# Patient Record
Sex: MALE | Race: AMERICAN INDIAN OR ALASKA NATIVE
[De-identification: names, ages, dates, MRNs, and addresses within clinical notes are randomized per-mention and may not be internally consistent; named-entity substitution may affect disease eponyms.]

---

## 2017-03-27 ENCOUNTER — HOSPITAL ENCOUNTER (EMERGENCY)
Dept: HOSPITAL 5 - ED | Age: 52
Discharge: LEFT BEFORE DISCHARGE | End: 2017-03-27
Payer: MEDICAID

## 2017-03-27 VITALS — DIASTOLIC BLOOD PRESSURE: 123 MMHG | SYSTOLIC BLOOD PRESSURE: 159 MMHG

## 2017-03-27 DIAGNOSIS — Z53.21: ICD-10-CM

## 2017-03-27 DIAGNOSIS — R51: ICD-10-CM

## 2017-03-27 DIAGNOSIS — R50.9: ICD-10-CM

## 2017-03-27 DIAGNOSIS — R05: Primary | ICD-10-CM

## 2017-03-27 LAB
ANION GAP SERPL CALC-SCNC: 16 MMOL/L
BASOPHILS NFR BLD AUTO: 1.2 % (ref 0–1.8)
BUN SERPL-MCNC: 9 MG/DL (ref 9–20)
BUN/CREAT SERPL: 9 %
CALCIUM SERPL-MCNC: 8.8 MG/DL (ref 8.4–10.2)
CHLORIDE SERPL-SCNC: 104.6 MMOL/L (ref 98–107)
CO2 SERPL-SCNC: 22 MMOL/L (ref 22–30)
EOSINOPHIL NFR BLD AUTO: 2.8 % (ref 0–4.3)
GLUCOSE SERPL-MCNC: 102 MG/DL (ref 75–100)
HCT VFR BLD CALC: 40.1 % (ref 35.5–45.6)
HGB BLD-MCNC: 13.2 GM/DL (ref 11.8–15.2)
MCH RBC QN AUTO: 29 PG (ref 28–32)
MCHC RBC AUTO-ENTMCNC: 33 % (ref 32–34)
MCV RBC AUTO: 89 FL (ref 84–94)
PLATELET # BLD: 191 K/MM3 (ref 140–440)
POTASSIUM SERPL-SCNC: 3.7 MMOL/L (ref 3.6–5)
RBC # BLD AUTO: 4.52 M/MM3 (ref 3.65–5.03)
SODIUM SERPL-SCNC: 139 MMOL/L (ref 137–145)
WBC # BLD AUTO: 9.2 K/MM3 (ref 4.5–11)

## 2017-03-27 PROCEDURE — 93005 ELECTROCARDIOGRAM TRACING: CPT

## 2017-03-27 PROCEDURE — 80048 BASIC METABOLIC PNL TOTAL CA: CPT

## 2017-03-27 PROCEDURE — 93010 ELECTROCARDIOGRAM REPORT: CPT

## 2017-03-27 PROCEDURE — 85025 COMPLETE CBC W/AUTO DIFF WBC: CPT

## 2017-03-27 PROCEDURE — 36415 COLL VENOUS BLD VENIPUNCTURE: CPT

## 2017-03-28 NOTE — ED ELOPEMENT REVIEW
ED Pt Elopement review





- Results review


Lab results: 





 Laboratory Tests











  03/27/17 03/27/17





  09:34 09:34


 


WBC  9.2 


 


RBC  4.52 


 


Hgb  13.2 


 


Hct  40.1 


 


MCV  89 


 


MCH  29 


 


MCHC  33 


 


RDW  14.8 


 


Plt Count  191 


 


Lymph % (Auto)  15.8 


 


Mono % (Auto)  7.1 


 


Eos % (Auto)  2.8 


 


Baso % (Auto)  1.2 


 


Lymph #  1.5 


 


Mono #  0.7 


 


Eos #  0.3 


 


Baso #  0.1 


 


Seg Neutrophils %  73.1 H 


 


Seg Neutrophils #  6.7 


 


Sodium   139


 


Potassium   3.7


 


Chloride   104.6


 


Carbon Dioxide   22


 


Anion Gap   16


 


BUN   9


 


Creatinine   1.0


 


Estimated GFR   > 60


 


BUN/Creatinine Ratio   9.00


 


Glucose   102 H


 


Calcium   8.8














- Call Back decision


Pt Call Back Decision: Pt to F/U with PMD (elevated bp, needs treatment and 

reevaluation)

## 2017-12-27 ENCOUNTER — HOSPITAL ENCOUNTER (EMERGENCY)
Dept: HOSPITAL 5 - ED | Age: 52
Discharge: HOME | End: 2017-12-27
Payer: MEDICAID

## 2017-12-27 VITALS — DIASTOLIC BLOOD PRESSURE: 97 MMHG | SYSTOLIC BLOOD PRESSURE: 124 MMHG

## 2017-12-27 DIAGNOSIS — R06.02: ICD-10-CM

## 2017-12-27 DIAGNOSIS — I50.9: Primary | ICD-10-CM

## 2017-12-27 DIAGNOSIS — F17.200: ICD-10-CM

## 2017-12-27 DIAGNOSIS — I25.2: ICD-10-CM

## 2017-12-27 DIAGNOSIS — I10: ICD-10-CM

## 2017-12-27 DIAGNOSIS — K21.9: ICD-10-CM

## 2017-12-27 LAB
ANION GAP SERPL CALC-SCNC: 16 MMOL/L
BASOPHILS NFR BLD AUTO: 0.4 % (ref 0–1.8)
BUN SERPL-MCNC: 16 MG/DL (ref 9–20)
BUN/CREAT SERPL: 15 %
CALCIUM SERPL-MCNC: 8.7 MG/DL (ref 8.4–10.2)
CHLORIDE SERPL-SCNC: 103.4 MMOL/L (ref 98–107)
CO2 SERPL-SCNC: 30 MMOL/L (ref 22–30)
EOSINOPHIL NFR BLD AUTO: 2.4 % (ref 0–4.3)
GLUCOSE SERPL-MCNC: 98 MG/DL (ref 75–100)
HCT VFR BLD CALC: 41.5 % (ref 35.5–45.6)
HGB BLD-MCNC: 13.9 GM/DL (ref 11.8–15.2)
MCH RBC QN AUTO: 30 PG (ref 28–32)
MCHC RBC AUTO-ENTMCNC: 34 % (ref 32–34)
MCV RBC AUTO: 91 FL (ref 84–94)
PLATELET # BLD: 180 K/MM3 (ref 140–440)
POTASSIUM SERPL-SCNC: 4.2 MMOL/L (ref 3.6–5)
RBC # BLD AUTO: 4.59 M/MM3 (ref 3.65–5.03)
SODIUM SERPL-SCNC: 145 MMOL/L (ref 137–145)
URINE DRUGS OF ABUSE NOTE: (no result)
WBC # BLD AUTO: 6.6 K/MM3 (ref 4.5–11)

## 2017-12-27 PROCEDURE — 36415 COLL VENOUS BLD VENIPUNCTURE: CPT

## 2017-12-27 PROCEDURE — 85025 COMPLETE CBC W/AUTO DIFF WBC: CPT

## 2017-12-27 PROCEDURE — 93010 ELECTROCARDIOGRAM REPORT: CPT

## 2017-12-27 PROCEDURE — 80048 BASIC METABOLIC PNL TOTAL CA: CPT

## 2017-12-27 PROCEDURE — 99285 EMERGENCY DEPT VISIT HI MDM: CPT

## 2017-12-27 PROCEDURE — 93005 ELECTROCARDIOGRAM TRACING: CPT

## 2017-12-27 PROCEDURE — 83880 ASSAY OF NATRIURETIC PEPTIDE: CPT

## 2017-12-27 PROCEDURE — 80307 DRUG TEST PRSMV CHEM ANLYZR: CPT

## 2017-12-27 PROCEDURE — 96374 THER/PROPH/DIAG INJ IV PUSH: CPT

## 2017-12-27 PROCEDURE — 71010: CPT

## 2017-12-27 PROCEDURE — 84484 ASSAY OF TROPONIN QUANT: CPT
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
## 2017-12-27 NOTE — XRAY REPORT
FINAL REPORT



PROCEDURE:  XR CHEST 1V AP



TECHNIQUE:  Chest radiograph anteroposterior view. CPT 83254







HISTORY:  Shortness of breath 



COMPARISON:  No prior studies are available for comparison.



FINDINGS:  

Heart: Magnified due to projection although appears to be mildly

enlarged..



Mediastinum/Vessels: Normal.



Lungs/Pleural space: Normal.



Bony thorax: No acute osseous abnormality.



Life support devices: None.



IMPRESSION:  

Cardiomegaly. No acute abnormalities are identified..

## 2018-01-04 ENCOUNTER — HOSPITAL ENCOUNTER (EMERGENCY)
Dept: HOSPITAL 5 - ED | Age: 53
Discharge: LEFT BEFORE DISCHARGE | End: 2018-01-04
Payer: MEDICAID

## 2018-01-04 VITALS — DIASTOLIC BLOOD PRESSURE: 117 MMHG | SYSTOLIC BLOOD PRESSURE: 148 MMHG

## 2018-01-04 DIAGNOSIS — R07.9: Primary | ICD-10-CM

## 2018-01-04 DIAGNOSIS — Z53.21: ICD-10-CM

## 2018-01-04 LAB
BASOPHILS # (AUTO): 0 K/MM3 (ref 0–0.1)
BASOPHILS NFR BLD AUTO: 0.4 % (ref 0–1.8)
BUN SERPL-MCNC: 15 MG/DL (ref 9–20)
BUN/CREAT SERPL: 14 %
CALCIUM SERPL-MCNC: 8.7 MG/DL (ref 8.4–10.2)
EOSINOPHIL # BLD AUTO: 0.1 K/MM3 (ref 0–0.4)
EOSINOPHIL NFR BLD AUTO: 1.8 % (ref 0–4.3)
HCT VFR BLD CALC: 41.7 % (ref 35.5–45.6)
HEMOLYSIS INDEX: 17
HGB BLD-MCNC: 13.5 GM/DL (ref 11.8–15.2)
INR PPP: 0.87 (ref 0.87–1.13)
LYMPHOCYTES # BLD AUTO: 1.8 K/MM3 (ref 1.2–5.4)
LYMPHOCYTES NFR BLD AUTO: 32.6 % (ref 13.4–35)
MCH RBC QN AUTO: 29 PG (ref 28–32)
MCHC RBC AUTO-ENTMCNC: 32 % (ref 32–34)
MCV RBC AUTO: 90 FL (ref 84–94)
MONOCYTES # (AUTO): 0.7 K/MM3 (ref 0–0.8)
MONOCYTES % (AUTO): 13.1 % (ref 0–7.3)
PLATELET # BLD: 181 K/MM3 (ref 140–440)
RBC # BLD AUTO: 4.65 M/MM3 (ref 3.65–5.03)

## 2018-01-04 PROCEDURE — 82553 CREATINE MB FRACTION: CPT

## 2018-01-04 PROCEDURE — 85610 PROTHROMBIN TIME: CPT

## 2018-01-04 PROCEDURE — 82550 ASSAY OF CK (CPK): CPT

## 2018-01-04 PROCEDURE — 93005 ELECTROCARDIOGRAM TRACING: CPT

## 2018-01-04 PROCEDURE — 71046 X-RAY EXAM CHEST 2 VIEWS: CPT

## 2018-01-04 PROCEDURE — 83880 ASSAY OF NATRIURETIC PEPTIDE: CPT

## 2018-01-04 PROCEDURE — 84484 ASSAY OF TROPONIN QUANT: CPT

## 2018-01-04 PROCEDURE — 85025 COMPLETE CBC W/AUTO DIFF WBC: CPT

## 2018-01-04 PROCEDURE — 93010 ELECTROCARDIOGRAM REPORT: CPT

## 2018-01-04 PROCEDURE — 80048 BASIC METABOLIC PNL TOTAL CA: CPT

## 2018-01-04 PROCEDURE — 36415 COLL VENOUS BLD VENIPUNCTURE: CPT

## 2018-01-04 NOTE — XRAY REPORT
PA and lateral chest:



SOB.



The heart is big. There is mild vascular congestion. No effusion and no 

infiltrate. The vascular congestion may be slightly worsened seen on 

prior exam of December 27, 2017.



Impression:



Probable mild CHF pattern.

## 2018-07-19 ENCOUNTER — HOSPITAL ENCOUNTER (INPATIENT)
Dept: HOSPITAL 5 - ED | Age: 53
LOS: 2 days | Discharge: HOME | DRG: 682 | End: 2018-07-21
Attending: INTERNAL MEDICINE | Admitting: INTERNAL MEDICINE
Payer: MEDICAID

## 2018-07-19 DIAGNOSIS — I13.0: ICD-10-CM

## 2018-07-19 DIAGNOSIS — E86.0: ICD-10-CM

## 2018-07-19 DIAGNOSIS — Z82.49: ICD-10-CM

## 2018-07-19 DIAGNOSIS — K85.90: ICD-10-CM

## 2018-07-19 DIAGNOSIS — Z79.82: ICD-10-CM

## 2018-07-19 DIAGNOSIS — J44.9: ICD-10-CM

## 2018-07-19 DIAGNOSIS — K52.9: ICD-10-CM

## 2018-07-19 DIAGNOSIS — I50.22: ICD-10-CM

## 2018-07-19 DIAGNOSIS — I42.0: ICD-10-CM

## 2018-07-19 DIAGNOSIS — K21.9: ICD-10-CM

## 2018-07-19 DIAGNOSIS — K56.7: ICD-10-CM

## 2018-07-19 DIAGNOSIS — F14.10: ICD-10-CM

## 2018-07-19 DIAGNOSIS — N17.9: Primary | ICD-10-CM

## 2018-07-19 DIAGNOSIS — I25.2: ICD-10-CM

## 2018-07-19 DIAGNOSIS — N18.9: ICD-10-CM

## 2018-07-19 DIAGNOSIS — I47.2: ICD-10-CM

## 2018-07-19 LAB
ALBUMIN SERPL-MCNC: 4.2 G/DL (ref 3.9–5)
ALT SERPL-CCNC: 17 UNITS/L (ref 7–56)
BASOPHILS # (AUTO): 0.1 K/MM3 (ref 0–0.1)
BASOPHILS NFR BLD AUTO: 0.8 % (ref 0–1.8)
BILIRUB UR QL STRIP: (no result)
BLOOD UR QL VISUAL: (no result)
BUN SERPL-MCNC: 29 MG/DL (ref 9–20)
BUN/CREAT SERPL: 7 %
CALCIUM SERPL-MCNC: 9.2 MG/DL (ref 8.4–10.2)
EOSINOPHIL # BLD AUTO: 0 K/MM3 (ref 0–0.4)
EOSINOPHIL NFR BLD AUTO: 0.2 % (ref 0–4.3)
HCT VFR BLD CALC: 47.8 % (ref 35.5–45.6)
HEMOLYSIS INDEX: 8
HGB BLD-MCNC: 16.4 GM/DL (ref 11.8–15.2)
LIPASE SERPL-CCNC: 94 UNITS/L (ref 13–60)
LIPASE SERPL-CCNC: 94 UNITS/L (ref 13–60)
LYMPHOCYTES # BLD AUTO: 1.9 K/MM3 (ref 1.2–5.4)
LYMPHOCYTES NFR BLD AUTO: 17.9 % (ref 13.4–35)
MCH RBC QN AUTO: 31 PG (ref 28–32)
MCHC RBC AUTO-ENTMCNC: 34 % (ref 32–34)
MCV RBC AUTO: 91 FL (ref 84–94)
MONOCYTES # (AUTO): 0.7 K/MM3 (ref 0–0.8)
MONOCYTES % (AUTO): 6.1 % (ref 0–7.3)
MUCOUS THREADS #/AREA URNS HPF: (no result) /HPF
PH UR STRIP: 5 [PH] (ref 5–7)
PLATELET # BLD: 260 K/MM3 (ref 140–440)
RBC # BLD AUTO: 5.25 M/MM3 (ref 3.65–5.03)
RBC #/AREA URNS HPF: 6 /HPF (ref 0–6)
UROBILINOGEN UR-MCNC: 2 MG/DL (ref ?–2)
WBC #/AREA URNS HPF: 3 /HPF (ref 0–6)

## 2018-07-19 PROCEDURE — C9113 INJ PANTOPRAZOLE SODIUM, VIA: HCPCS

## 2018-07-19 PROCEDURE — 80053 COMPREHEN METABOLIC PANEL: CPT

## 2018-07-19 PROCEDURE — 93005 ELECTROCARDIOGRAM TRACING: CPT

## 2018-07-19 PROCEDURE — 83036 HEMOGLOBIN GLYCOSYLATED A1C: CPT

## 2018-07-19 PROCEDURE — 36415 COLL VENOUS BLD VENIPUNCTURE: CPT

## 2018-07-19 PROCEDURE — 83735 ASSAY OF MAGNESIUM: CPT

## 2018-07-19 PROCEDURE — 84484 ASSAY OF TROPONIN QUANT: CPT

## 2018-07-19 PROCEDURE — 74176 CT ABD & PELVIS W/O CONTRAST: CPT

## 2018-07-19 PROCEDURE — 85025 COMPLETE CBC W/AUTO DIFF WBC: CPT

## 2018-07-19 PROCEDURE — 82150 ASSAY OF AMYLASE: CPT

## 2018-07-19 PROCEDURE — 81001 URINALYSIS AUTO W/SCOPE: CPT

## 2018-07-19 PROCEDURE — 99406 BEHAV CHNG SMOKING 3-10 MIN: CPT

## 2018-07-19 PROCEDURE — 83690 ASSAY OF LIPASE: CPT

## 2018-07-19 PROCEDURE — 93306 TTE W/DOPPLER COMPLETE: CPT

## 2018-07-19 PROCEDURE — 80048 BASIC METABOLIC PNL TOTAL CA: CPT

## 2018-07-19 PROCEDURE — 93010 ELECTROCARDIOGRAM REPORT: CPT

## 2018-07-19 PROCEDURE — 74018 RADEX ABDOMEN 1 VIEW: CPT

## 2018-07-19 RX ADMIN — Medication SCH ML: at 23:11

## 2018-07-19 RX ADMIN — DEXTROSE AND SODIUM CHLORIDE SCH MLS/HR: 5; .9 INJECTION, SOLUTION INTRAVENOUS at 23:05

## 2018-07-19 RX ADMIN — PANTOPRAZOLE SODIUM SCH MG: 40 INJECTION, POWDER, FOR SOLUTION INTRAVENOUS at 23:11

## 2018-07-19 RX ADMIN — MORPHINE SULFATE PRN MG: 2 INJECTION, SOLUTION INTRAMUSCULAR; INTRAVENOUS at 23:12
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
## 2018-07-19 NOTE — CAT SCAN REPORT
FINAL REPORT



PROCEDURE:  CT ABDOMEN PELVIS WO CON



TECHNIQUE:  Computerized axial tomography of the abdomen and

pelvis was performed without intravenous contrast. This study is

performed without intravascular contrast material and its

sensitivity for abdominal and pelvic pathology, including

neoplasms, inflammation, abscess, free fluid, thrombosis,

arterial dissection and infarction, is reduced compared with a

contrast enhanced study. DLP 1783.26 mGy-cm.



HISTORY:  Abdominal pain. Nausea/vomiting. 



COMPARISON:  No prior studies are available for comparison.



FINDINGS:  

Visualized lower thorax: Mild cardiomegaly. 



Liver: Normal size and attenuation.



Spleen: Normal size and attenuation.



Gallbladder and biliary system: Normal.



Pancreas: Normal.



Adrenals: Bilateral thickened somewhat nodular appearing adrenal

glands. Adreniform shape relatively maintained..



Kidneys: Normal.



GI tract: Moderately distended duodenum and jejunum, with gradual

tapering to more normal caliber ileum. Normal caliber air-filled

appendix. Thick-walled short segment distal transverse colon wall

thickening measuring 11 mm (image 69 series 2, image 41 series

602). Descending and more distal colon decompressed. Scattered

colonic diverticula. 



Lymph nodes and mesentery: Normal.



Vasculature: Normal.



Bladder: Normal.



Reproductive organs: Small prostatic calcifications.



Peritoneum: No free fluid.



Musculoskeletal structures: Slight L4-5 and mild L5-S1 disc

bulges. L4-5 ligamentum flavum thickening. Tiny multilevel

osteophytes. L5-S1 disc space narrowing and vacuum disc change.

Probably benign cystic and/or fibrous lesion in the left iliac

wing measuring 2.8 x 1.6 cm. Mild narrowing, osteophytes, and

small subchondral cysts about the right hip joint. 



Other: Small fat filled umbilical hernia. Right greater than left

fat filled inguinal hernias. 



IMPRESSION:  

Mild cardiomegaly.



No CT evidence of renal/ureteral stone or obstruction. 



Bilateral thickened somewhat nodular appearing adrenal glands

with relatively maintained adreniform shape. Consider adrenal

hyperplasia. 



Distended duodenum and jejunum, with gradual tapering. Findings

more likely represent an ileus or enteritis rather than

obstruction, consider attention on followup radiographs if there

is continued clinical concern. 



Short segment of distal transverse colonic wall thickening and

narrowing, likely peristalsis but consider further evaluation

including colonoscopy if there is concern for subtle annular

colonic lesion. 



Diverticulosis. 



Small fat filled umbilical and right greater than left inguinal

hernias. 



Other incidental findings as above.

## 2018-07-20 LAB
ALBUMIN SERPL-MCNC: 3.7 G/DL (ref 3.9–5)
ALT SERPL-CCNC: 17 UNITS/L (ref 7–56)
BASOPHILS # (AUTO): 0 K/MM3 (ref 0–0.1)
BASOPHILS NFR BLD AUTO: 0.3 % (ref 0–1.8)
BUN SERPL-MCNC: 30 MG/DL (ref 9–20)
BUN/CREAT SERPL: 12 %
CALCIUM SERPL-MCNC: 8.4 MG/DL (ref 8.4–10.2)
EOSINOPHIL # BLD AUTO: 0 K/MM3 (ref 0–0.4)
EOSINOPHIL NFR BLD AUTO: 0.3 % (ref 0–4.3)
HCT VFR BLD CALC: 43.6 % (ref 35.5–45.6)
HEMOLYSIS INDEX: 2
HGB BLD-MCNC: 14.5 GM/DL (ref 11.8–15.2)
LYMPHOCYTES # BLD AUTO: 1.5 K/MM3 (ref 1.2–5.4)
LYMPHOCYTES NFR BLD AUTO: 18.4 % (ref 13.4–35)
MCH RBC QN AUTO: 31 PG (ref 28–32)
MCHC RBC AUTO-ENTMCNC: 33 % (ref 32–34)
MCV RBC AUTO: 92 FL (ref 84–94)
MONOCYTES # (AUTO): 0.8 K/MM3 (ref 0–0.8)
MONOCYTES % (AUTO): 9.1 % (ref 0–7.3)
PLATELET # BLD: 205 K/MM3 (ref 140–440)
RBC # BLD AUTO: 4.75 M/MM3 (ref 3.65–5.03)

## 2018-07-20 RX ADMIN — CARVEDILOL SCH MG: 6.25 TABLET, FILM COATED ORAL at 16:00

## 2018-07-20 RX ADMIN — HEPARIN SODIUM SCH UNIT: 5000 INJECTION, SOLUTION INTRAVENOUS; SUBCUTANEOUS at 22:41

## 2018-07-20 RX ADMIN — MORPHINE SULFATE PRN MG: 2 INJECTION, SOLUTION INTRAMUSCULAR; INTRAVENOUS at 06:25

## 2018-07-20 RX ADMIN — Medication SCH ML: at 09:25

## 2018-07-20 RX ADMIN — DEXTROSE AND SODIUM CHLORIDE SCH MLS/HR: 5; .9 INJECTION, SOLUTION INTRAVENOUS at 22:47

## 2018-07-20 RX ADMIN — DEXTROSE AND SODIUM CHLORIDE SCH MLS/HR: 5; .9 INJECTION, SOLUTION INTRAVENOUS at 06:25

## 2018-07-20 RX ADMIN — CARVEDILOL SCH MG: 6.25 TABLET, FILM COATED ORAL at 22:38

## 2018-07-20 RX ADMIN — Medication SCH ML: at 22:30

## 2018-07-20 RX ADMIN — PANTOPRAZOLE SODIUM SCH MG: 40 INJECTION, POWDER, FOR SOLUTION INTRAVENOUS at 22:39

## 2018-07-20 RX ADMIN — PANTOPRAZOLE SODIUM SCH MG: 40 INJECTION, POWDER, FOR SOLUTION INTRAVENOUS at 15:00

## 2018-07-20 NOTE — CONSULTATION
History of Present Illness





- Reason for Consult


Consult date: 07/20/18


acute renal failure





- History of Present Illness





Pleasant middle aged  male with history pertinent for HTN, CHF, 

CAD s/p PCI, and HLD, who presented to the hospital secondary to symptoms of 

reocurent nasuea, vomiting and abdominal discomfort compounded by decreased PO 

intake. These symptoms had occured for the past 5 days before patient came to 

the ER for further evaluation. His father had recently passed away. In this 

setting of decreased PO intake his serum creatinine level was elevated at 3.7 

from a baseline of 1.2-1.4. His serum creatinine has already shown improvement 

down to 2.5 after fluid hydration. Nephrology was consulted for acute renal 

injury. He denies nay symptoms of pain with urination, hematuria or decreased 

urination. He states that also this week he had been working outside one day 

prior to arrival to the ER. During the time that he was working he managed to 

only drink ~16 floz of gatorade. He apparently was working outside in the heat 

for ~6 hrs. He states the he was never aware of an elevated creatinine in the 

past and that he has never been evaluated by nephrology. He is also being 

managed cardiology along with his PCP as an outpatient. Today he states that he 

is feeling better and he has been started on a clear liquid diet upon the time 

of examination. 





Past History


Past Medical History: CAD, hypertension, hyperlipidemia


Past Surgical History: Other (eye surgery )


Social history: no significant social history


Family history: hypertension





Medications and Allergies


 Allergies











Allergy/AdvReac Type Severity Reaction Status Date / Time


 


No Known Allergies Allergy   Verified 12/27/17 16:12











 Home Medications











 Medication  Instructions  Recorded  Confirmed  Last Taken  Type


 


ALBUTEROL Inhaler [ProAir HFA 1 - 2 puff IH Q4-6H PRN #1 inha 10/06/17 05/30/18 

Unknown Rx





Inhaler]     


 


Lisinopril [Zestril TAB] 20 mg PO QDAY #30 tablet 10/06/17 05/30/18 05/28/18 Rx


 


Carvedilol [Coreg] 12.5 mg PO BID 05/30/18 05/30/18 05/28/18 History


 


Pantoprazole [Protonix TAB] 40 mg PO QDAY 05/30/18 05/30/18 05/28/18 History


 


Potassium Chloride [K-Dur] 10 meq PO QDAY 05/30/18 05/30/18 05/28/18 History


 


Aspirin [Aspirin TAB] 325 mg PO QDAY #30 tablet 05/31/18  Unknown Rx


 


Spironolactone [Aldactone] 25 mg PO QDAY #30 tablet 05/31/18  Unknown Rx


 


Torsemide [Demadex] 20 mg PO DAILY #30 tablet 05/31/18  Unknown Rx











Active Meds: 


Active Medications





Acetaminophen (Tylenol)  650 mg PO Q4H PRN


   PRN Reason: Pain MILD(1-3)/Fever >100.5/HA


Carvedilol (Coreg)  6.25 mg PO BID [facility]


Dextrose/Sodium Chloride (D5ns)  1,000 mls @ 100 mls/hr IV AS DIRECT [facility]


   Last Admin: 07/20/18 06:25 Dose:  100 mls/hr


Metoclopramide HCl (Reglan)  5 mg IV Q6H PRN


   PRN Reason: Nausea And Vomiting


Morphine Sulfate (Morphine)  2 mg IV Q4H PRN


   PRN Reason: Pain, Moderate (4-6)


   Last Admin: 07/20/18 06:25 Dose:  2 mg


Ondansetron HCl (Zofran)  4 mg IV Q8H PRN


   PRN Reason: Nausea And Vomiting


Pantoprazole Sodium (Protonix)  40 mg IV BID [facility]


   Stop: 07/20/18 23:59


   Last Admin: 07/19/18 23:11 Dose:  40 mg


Pantoprazole Sodium (Protonix)  40 mg PO BID [facility]


Promethazine HCl (Phenergan)  25 mg NE Q6H PRN


   PRN Reason: N/V IF NPO AND NO IV ACCESS


Sodium Chloride (Sodium Chloride Flush Syringe 10 Ml)  10 ml IV BID [facility]


   Last Admin: 07/19/18 23:11 Dose:  10 ml


Sodium Chloride (Sodium Chloride Flush Syringe 10 Ml)  10 ml IV PRN PRN


   PRN Reason: LINE FLUSH











Review of Systems


All systems: negative


Constitutional: fatigue, weakness, poor appetite


Gastrointestinal: abdominal pain, nausea, vomiting, excessive gas, dyspepsia/

bloating, early satiety





Exam





- Vital Signs


Vital signs: 


 Vital Signs











Temp Pulse Resp BP Pulse Ox


 


 97.6 F   69   16   101/66   95 


 


 07/19/18 10:26  07/19/18 10:26  07/19/18 10:26  07/19/18 10:26  07/19/18 10:26














- General Appearance


General appearance: well-developed, well-nourished, appears stated age


EENT: PERRL, mucous membranes moist


Neck: Present: neck supple, trachea midline


Respiratory: Clear to Ascultation


Heart: regular, normal heart rate, S1S2, no murmurs


Gastrointestinal: Present: normal, normoactive bowel sounds


Integumentary: no rash, warm and dry


Neurologic: no focal deficit, no asterixis, alert and oriented x3


Musculoskeletal: Present: deferred


Psychiatric: mood/affect appropriate





Results





- Lab Results





 07/20/18 07:06





 07/20/18 07:06


 Most recent lab results











Calcium  8.4 mg/dL (8.4-10.2)   07/20/18  07:06    














Assessment and Plan





- Patient Problems


(1) Acute on chronic renal failure


Current Visit: Yes   Status: Acute   


Plan to address problem: 


His renal function is improving with fluid hydration. Encourage PO intake as 

tolerated.


Avoid nephrotoxins. Would continue to hold ACei and monitor blood pressure 

closely. Once he is tolerating PO intake, would favor to discontinue his IV 

fluids given his cardiac history and h/o CHF.


 








(2) Hypertension with renal disease


Current Visit: Yes   Status: Acute   


Plan to address problem: 


Patient restarted on coreg with ACEi being held. His current blood pressures 

are stable at present time. Will continue to monitor. 








(3) CHF (congestive heart failure)


Current Visit: No   Status: Chronic   


Qualifiers: 


   Qualified Code(s): I50.9 - Heart failure, unspecified   


Plan to address problem: 


Given his history of CHF, please avoid standing IVF once he is able to tolerate 

PO intake.


Patient should already be on a low sodium diet.  








(4) Acute pancreatitis


Current Visit: Yes   Status: Acute   


Qualifiers: 


   Acute pancreatitis complication: unspecified 


Plan to address problem: 


Patient is now on clear liquids and agree with advancing diet as tolerated. 

Would encourage more PO intake and avoid standing IVF given history of CHF.

## 2018-07-20 NOTE — PROGRESS NOTE
Assessment and Plan


Assessment and plan: 





53-year-old -American male with past medical history significant for 

cardiomyopathy with EF of 20%, cocaine abuse, hypertension presented to the 

emergency department complaining of recurrent nausea and vomiting.  At 

presentation patient was dehydrated and creatinine was 3.7.  CT abdomen and 

pelvis showed ileus, that resolved this morning.


Acute renal failure, likely due to dehydration


- Patient is on IV fluids


- Nephrology is following


- Creatinine is from 3.7-2.5


- We will monitor closely


Nausea and vomiting secondary to ileus


- Abdominal x-ray showed ileus resolved


- Nausea and vomiting subsided


- Patient stated advanced to a full cardiac diet


Cardiomyopathy ejection fraction of 20 percent


- Patient refused ICD previously


- Patient had nonsustained V. tach and cardiology evaluated him and recommended 

no further cardiac workup


- No shortness of breath, will stop the fluid if the patient has any signs of 

fluid overload


Hypertension


- Held ACEI because of acute renal failure and continue with Coreg


- BP is well-controlled


DVT prophylaxis


- Heparin


GI prophylaxis


- On Protonix


Disposition


- Possible discharge tomorrow if ARF is progressively improving.





History


Interval history: 





Patient was seen and evaluated this morning, nausea and vomiting subsided.  

Patient's complaining generalized weakness.  No chest pain.





Hospitalist Physical





- Physical exam


Narrative exam: 





 Not in cardiopulmonary distress. 


 The patient appeared well nourished and normally developed.


 Vital signs as documented.


 Head exam is unremarkable.


 No scleral icterus .


 Neck is without jugular venous distension, thyromegaly, or carotid bruits. 


 Lungs are clear to auscultation.


Cardiac exam reveals regular rate and  Rhythm. First and second heart sounds 

normal. No murmurs, rubs or gallops. 


Abdominal exam reveals normal bowel sounds, no masses, no organomegaly and no 

aortic enlargement. 


Extremities are nonedematous and both femoral and pedal pulses are normal.


CNS: Alert and oriented 3.  No focal weakness.





- Constitutional


Vitals: 


 











Temp Pulse Resp BP Pulse Ox


 


 99.5 F   76   20   130/99   99 


 


 07/20/18 13:11  07/20/18 13:11  07/20/18 13:11  07/20/18 13:11  07/20/18 13:11











General appearance: Present: no acute distress





Results





- Labs


CBC & Chem 7: 


 07/20/18 07:06





 07/20/18 07:06


Labs: 


 Laboratory Last Values











WBC  8.4 K/mm3 (4.5-11.0)   07/20/18  07:06    


 


RBC  4.75 M/mm3 (3.65-5.03)   07/20/18  07:06    


 


Hgb  14.5 gm/dl (11.8-15.2)   07/20/18  07:06    


 


Hct  43.6 % (35.5-45.6)   07/20/18  07:06    


 


MCV  92 fl (84-94)   07/20/18  07:06    


 


MCH  31 pg (28-32)   07/20/18  07:06    


 


MCHC  33 % (32-34)   07/20/18  07:06    


 


RDW  15.0 % (13.2-15.2)   07/20/18  07:06    


 


Plt Count  205 K/mm3 (140-440)   07/20/18  07:06    


 


Lymph % (Auto)  18.4 % (13.4-35.0)   07/20/18  07:06    


 


Mono % (Auto)  9.1 % (0.0-7.3)  H  07/20/18  07:06    


 


Eos % (Auto)  0.3 % (0.0-4.3)   07/20/18  07:06    


 


Baso % (Auto)  0.3 % (0.0-1.8)   07/20/18  07:06    


 


Lymph #  1.5 K/mm3 (1.2-5.4)   07/20/18  07:06    


 


Mono #  0.8 K/mm3 (0.0-0.8)   07/20/18  07:06    


 


Eos #  0.0 K/mm3 (0.0-0.4)   07/20/18  07:06    


 


Baso #  0.0 K/mm3 (0.0-0.1)   07/20/18  07:06    


 


Seg Neutrophils %  71.9 % (40.0-70.0)  H  07/20/18  07:06    


 


Seg Neutrophils #  6.0 K/mm3 (1.8-7.7)   07/20/18  07:06    


 


Sodium  142 mmol/L (137-145)   07/20/18  07:06    


 


Potassium  3.8 mmol/L (3.6-5.0)   07/20/18  07:06    


 


Chloride  101.9 mmol/L ()   07/20/18  07:06    


 


Carbon Dioxide  26 mmol/L (22-30)   07/20/18  07:06    


 


Anion Gap  18 mmol/L  07/20/18  07:06    


 


BUN  30 mg/dL (9-20)  H  07/20/18  07:06    


 


Creatinine  2.5 mg/dL (0.8-1.5)  H  07/20/18  07:06    


 


Estimated GFR  33 ml/min  07/20/18  07:06    


 


BUN/Creatinine Ratio  12 %  07/20/18  07:06    


 


Glucose  109 mg/dL ()  H  07/20/18  07:06    


 


Hemoglobin A1c  6.3 % (4-6)  H  07/19/18  21:24    


 


Calcium  8.4 mg/dL (8.4-10.2)   07/20/18  07:06    


 


Magnesium  2.00 mg/dL (1.7-2.3)   07/20/18  15:28    


 


Total Bilirubin  0.90 mg/dL (0.1-1.2)   07/20/18  07:06    


 


AST  21 units/L (5-40)   07/20/18  07:06    


 


ALT  17 units/L (7-56)   07/20/18  07:06    


 


Alkaline Phosphatase  65 units/L ()   07/20/18  07:06    


 


Troponin T  0.028 ng/mL (0.00-0.029)   07/19/18  15:31    


 


Total Protein  6.5 g/dL (6.3-8.2)   07/20/18  07:06    


 


Albumin  3.7 g/dL (3.9-5)  L  07/20/18  07:06    


 


Albumin/Globulin Ratio  1.3 %  07/20/18  07:06    


 


Amylase  252 units/L ()  H  07/20/18  07:06    


 


Lipase  94 units/L (13-60)  H  07/19/18  15:31    


 


Urine Color  Yellow  (Yellow)   07/19/18  14:35    


 


Urine Turbidity  Clear  (Clear)   07/19/18  14:35    


 


Urine pH  5.0  (5.0-7.0)   07/19/18  14:35    


 


Ur Specific Gravity  1.025  (1.003-1.030)   07/19/18  14:35    


 


Urine Protein  30 mg/dl mg/dL (Negative)   07/19/18  14:35    


 


Urine Glucose (UA)  50 mg/dL (Negative)   07/19/18  14:35    


 


Urine Ketones  Tr mg/dL (Negative)   07/19/18  14:35    


 


Urine Blood  Neg  (Negative)   07/19/18  14:35    


 


Urine Nitrite  Neg  (Negative)   07/19/18  14:35    


 


Urine Bilirubin  Neg  (Negative)   07/19/18  14:35    


 


Urine Urobilinogen  2.0 mg/dL (<2.0)   07/19/18  14:35    


 


Ur Leukocyte Esterase  Neg  (Negative)   07/19/18  14:35    


 


Urine WBC (Auto)  3.0 /HPF (0.0-6.0)   07/19/18  14:35    


 


Urine RBC (Auto)  6.0 /HPF (0.0-6.0)   07/19/18  14:35    


 


Urine Mucus  Few /HPF  07/19/18  14:35

## 2018-07-20 NOTE — CONSULTATION
History of Present Illness


Consult date: 07/20/18


Consult reason: arrhythmia


History of present illness: 





52-year-old man with a long history of dilated nonischemic cardiomyopathy. A 

cardiac catheterization several years ago at [facility] demonstrated 

the absence of coronary artery disease. His latest echocardiogram documents a 

severe left ventricular dysfunction, ejection fraction 15-20%. Stress thallium 

test showed no ischemia. He has been on medical therapy for systolic left 

ventricle dysfunction and undergoing ICD evaluation as an outpatient.





He presents to the hospital nausea, vomiting, abdominal pain, admitted with 

acute renal failure and dehydration. Creatinine of 3.9 on presentation. This 

morning, while on telemetry monitoring patient noted a short burst of non-

sustained ventricular tachycardia. Cardiac consultation was requested. Patient 

remained asymptomatic. There were no unusual shortness of breath, or chest 

pain. He denies palpitations. 12 lead ECG is a sinus rhythm, LVH with 

repolarization abnormalities. 





Medications and Allergies


 Allergies











Allergy/AdvReac Type Severity Reaction Status Date / Time


 


No Known Allergies Allergy   Verified 12/27/17 16:12











 Home Medications











 Medication  Instructions  Recorded  Confirmed  Last Taken  Type


 


ALBUTEROL Inhaler [ProAir HFA 1 - 2 puff IH Q4-6H PRN #1 inha 10/06/17 05/30/18 

Unknown Rx





Inhaler]     


 


Lisinopril [Zestril TAB] 20 mg PO QDAY #30 tablet 10/06/17 05/30/18 05/28/18 Rx


 


Carvedilol [Coreg] 12.5 mg PO BID 05/30/18 05/30/18 05/28/18 History


 


Pantoprazole [Protonix TAB] 40 mg PO QDAY 05/30/18 05/30/18 05/28/18 History


 


Potassium Chloride [K-Dur] 10 meq PO QDAY 05/30/18 05/30/18 05/28/18 History


 


Aspirin [Aspirin TAB] 325 mg PO QDAY #30 tablet 05/31/18  Unknown Rx


 


Spironolactone [Aldactone] 25 mg PO QDAY #30 tablet 05/31/18  Unknown Rx


 


Torsemide [Demadex] 20 mg PO DAILY #30 tablet 05/31/18  Unknown Rx











Active Meds: 


Active Medications





Acetaminophen (Tylenol)  650 mg PO Q4H PRN


   PRN Reason: Pain MILD(1-3)/Fever >100.5/HA


Dextrose/Sodium Chloride (D5ns)  1,000 mls @ 100 mls/hr IV AS DIRECT [facility]


   Last Admin: 07/20/18 06:25 Dose:  100 mls/hr


Metoclopramide HCl (Reglan)  5 mg IV Q6H PRN


   PRN Reason: Nausea And Vomiting


Morphine Sulfate (Morphine)  2 mg IV Q4H PRN


   PRN Reason: Pain, Moderate (4-6)


   Last Admin: 07/20/18 06:25 Dose:  2 mg


Ondansetron HCl (Zofran)  4 mg IV Q8H PRN


   PRN Reason: Nausea And Vomiting


Pantoprazole Sodium (Protonix)  40 mg IV BID [facility]


   Last Admin: 07/19/18 23:11 Dose:  40 mg


Promethazine HCl (Phenergan)  25 mg NC Q6H PRN


   PRN Reason: N/V IF NPO AND NO IV ACCESS


Sodium Chloride (Sodium Chloride Flush Syringe 10 Ml)  10 ml IV BID [facility]


   Last Admin: 07/19/18 23:11 Dose:  10 ml


Sodium Chloride (Sodium Chloride Flush Syringe 10 Ml)  10 ml IV PRN PRN


   PRN Reason: LINE FLUSH











Physical Examination


 Vital Signs











Temp Pulse Resp BP Pulse Ox


 


 97.6 F   69   16   101/66   95 


 


 07/19/18 10:26  07/19/18 10:26  07/19/18 10:26  07/19/18 10:26  07/19/18 10:26











General appearance: no acute distress


Cardiac: Positive: Reg Rate and Rhythm


Neuro: Positive: Grossly Intact


Extremities: Absent: edema





Results





 07/20/18 07:06





 07/20/18 07:06


 Cardiac Enzymes











  07/20/18 Range/Units





  07:06 


 


AST  21  (5-40)  units/L








 CBC











  07/20/18 Range/Units





  07:06 


 


WBC  8.4  (4.5-11.0)  K/mm3


 


RBC  4.75  (3.65-5.03)  M/mm3


 


Hgb  14.5  (11.8-15.2)  gm/dl


 


Hct  43.6  (35.5-45.6)  %


 


Plt Count  205  (140-440)  K/mm3


 


Lymph #  1.5  (1.2-5.4)  K/mm3


 


Mono #  0.8  (0.0-0.8)  K/mm3


 


Eos #  0.0  (0.0-0.4)  K/mm3


 


Baso #  0.0  (0.0-0.1)  K/mm3








 Comprehensive Metabolic Panel











  07/20/18 Range/Units





  07:06 


 


Sodium  142  (137-145)  mmol/L


 


Potassium  3.8  (3.6-5.0)  mmol/L


 


Chloride  101.9  ()  mmol/L


 


Carbon Dioxide  26  (22-30)  mmol/L


 


BUN  30 H  (9-20)  mg/dL


 


Creatinine  2.5 H  (0.8-1.5)  mg/dL


 


Glucose  109 H  ()  mg/dL


 


Calcium  8.4  (8.4-10.2)  mg/dL


 


AST  21  (5-40)  units/L


 


ALT  17  (7-56)  units/L


 


Alkaline Phosphatase  65  ()  units/L


 


Total Protein  6.5  (6.3-8.2)  g/dL


 


Albumin  3.7 L  (3.9-5)  g/dL














Assessment and Plan





Acute renal failure


Dehydration


Dilated nonischemic cardiomyopathy


   EF 10-15% on echo at [facility] 09/2017


   fixed inferior wall defect on MPI at [facility] 09/2017. This is 

unchanged when compared to prior study.





Recommendations:


Continue medical therapy for his dilated nonischemic cardiomyopathy.


Sodium/fluid restriction.


As an outpatient, patient is being considered for elective ICD therapy.

## 2018-07-20 NOTE — XRAY REPORT
AP ABDOMEN:



HISTORY: Ileus.



The abdominal gas pattern is unremarkable.  No masses or

organomegaly is identified and there is no gross evidence of free air 

or fluid.  No significant soft tissue calcifications are noted.



IMPRESSION:

Unremarkable abdomen.

## 2018-07-21 VITALS — SYSTOLIC BLOOD PRESSURE: 119 MMHG | DIASTOLIC BLOOD PRESSURE: 90 MMHG

## 2018-07-21 LAB
BUN SERPL-MCNC: 17 MG/DL (ref 9–20)
BUN/CREAT SERPL: 9 %
CALCIUM SERPL-MCNC: 8.4 MG/DL (ref 8.4–10.2)
HEMOLYSIS INDEX: 4

## 2018-07-21 RX ADMIN — DEXTROSE AND SODIUM CHLORIDE SCH MLS/HR: 5; .9 INJECTION, SOLUTION INTRAVENOUS at 08:46

## 2018-07-21 RX ADMIN — HEPARIN SODIUM SCH UNIT: 5000 INJECTION, SOLUTION INTRAVENOUS; SUBCUTANEOUS at 10:39

## 2018-07-21 RX ADMIN — MORPHINE SULFATE PRN MG: 2 INJECTION, SOLUTION INTRAMUSCULAR; INTRAVENOUS at 08:46

## 2018-07-21 RX ADMIN — Medication SCH ML: at 10:41

## 2018-07-21 RX ADMIN — CARVEDILOL SCH MG: 6.25 TABLET, FILM COATED ORAL at 10:40

## 2018-07-21 NOTE — DISCHARGE SUMMARY
Providers





- Providers


Date of Admission: 


07/19/18 18:15





Attending physician: 


LOY HANNA MD





 





07/19/18 21:40


Consult to Physician [CONS] Routine 


   Comment: 


   Consulting Provider: RON GARCIA


   Physician Instructions: 


   Reason For Exam: TOM





07/20/18 08:13


Consult to Cardiology [CONS] Stat 


   Consulting Provider: ELSA CARMICHAEL


   Reason For Exam: PRevious HX of MI, Wide Complex V-tach











Primary care physician: 


PRIMARY CARE MD








Hospitalization


Reason for admission: Ileus, Acute renal failure, Chronic systolic CHF


Condition: Stable


Pertinent studies: 





Echo EF 20%


CT abdomen and Pelvis ; ileus


Hospital course: 





53-year-old -American male with past medical history significant for 

cardiomyopathy with EF of 20%, cocaine abuse, hypertension presented to the 

emergency department complaining of recurrent nausea and vomiting.  At 

presentation patient was dehydrated and creatinine was 3.7.  CT abdomen and 

pelvis showed ileus, that resolved this morning.


Acute renal failure, likely due to dehydration


Admission creatinine was 3.7 and after rehydration his creatinine level was 

1.8.  Patient advised to increase fluid intake


Nausea and vomiting secondary to ileus


- Abdominal x-ray showed ileus resolved


- Nausea and vomiting subsided


- Patient tolerated full cardiac diet


Cardiomyopathy ejection fraction of 20 percent


- Patient refused ICD previously


- Patient had nonsustained V. tach and cardiology evaluated him and recommended 

no further cardiac workup


Hypertension


- Held ACEI because of acute renal failure and continue with Coreg, I have 

added hydralazine last discharge


- BP is well-controlled


Patient was hemodynamically stable at the time of discharge.  Patient 

discharged home.


Disposition: DC-01 TO HOME OR SELFCARE


Time spent for discharge: 31 minutes





- Discharge Diagnoses


(1) Chronic systolic congestive heart failure


Status: Chronic   





(2) Acute gastroenteritis


Status: Acute   





(3) Acute on chronic renal failure


Status: Acute   





(4) CHF (congestive heart failure)


Status: Chronic   





Core Measure Documentation





- Palliative Care


Palliative Care/ Comfort Measures: Not Applicable





- Core Measures


Any of the following diagnoses?: none





- Heart Failure Discharge Requirements


ACE/ARB for LVSD if EF <40%: No


Reason for no ACE/ARB: Renal impairment


Beta blocker at discharge: Yes





Exam





- Physical Exam


Narrative exam: 





 Not in cardiopulmonary distress. 


 The patient appeared well nourished and normally developed.


 Vital signs as documented.


 Head exam is unremarkable.


 No scleral icterus .


 Neck is without jugular venous distension, thyromegaly, or carotid bruits. 


 Lungs are clear to auscultation.


Cardiac exam reveals regular rate and  Rhythm. First and second heart sounds 

normal. No murmurs, rubs or gallops. 


Abdominal exam reveals normal bowel sounds, no masses, no organomegaly and no 

aortic enlargement. 


Extremities are nonedematous and both femoral and pedal pulses are normal.


CNS: Alert and oriented 3.  No focal weakness.





- Constitutional


Vitals: 


 











Temp Pulse Resp BP Pulse Ox


 


 98.7 F   82   18   130/91   95 


 


 07/20/18 22:52  07/21/18 06:05  07/21/18 06:05  07/20/18 22:52  07/21/18 06:05














Plan


Activity: no restrictions


Weight Bearing Status: Full Weight Bearing


Diet: low cholesterol, low salt


Additional Instructions: Follow at [facility] in 1-2 weeks


Follow up with: 


PRIMARY CARE,MD [Primary Care Provider] - 3-5 Days


Prescriptions: 


Hydralazine HCl 50 mg PO BID #60 tablet

## 2018-07-21 NOTE — PROGRESS NOTE
Assessment and Plan





- Patient Problems


(1) Acute on chronic renal failure


Current Visit: Yes   Status: Acute   


Plan to address problem: 


Renal function has improved


Will need to follow up labs with his PCP to ensure that his creatinine 

continues to improve back to his baseline. 


Patient stable for discharge from renal standpoint. 


Should follow up with nephrology in the am. 








(2) Hypertension with renal disease


Current Visit: Yes   Status: Acute   


Plan to address problem: 


His blood pressure is stable at this point. 


Needs to follow with PCP and cardiology. Believe that he would benefit from 

ACEi once his renal function has further stabilized, given his ECHO findings of 

low EF. No urgency to restart ACEi now and this can be restarted in the 

outpatient setting. 








(3) CHF (congestive heart failure)


Current Visit: No   Status: Chronic   


Qualifiers: 


   Qualified Code(s): I50.9 - Heart failure, unspecified   


Plan to address problem: 


He needs to be careful about sodium and fluid intake. Counseled him on the 

importance of daily weight and to look for sings of edema. Needs to follow with 

cardiology. ECHO findings noted. 








(4) Acute pancreatitis


Current Visit: Yes   Status: Acute   


Qualifiers: 


   Acute pancreatitis complication: unspecified 


Plan to address problem: 


Symptoms resolved. Tolerating PO intake. 








Subjective


Date of service: 07/21/18


Interval history: 





No acute issues. He is stable and getting ready for discharge. labs noted and 

improvement in renal function noted. 





Objective





- Vital Signs


Vital signs: 


 Vital Signs - 12hr











  07/21/18 07/21/18 07/21/18





  06:05 10:36 10:40


 


Temperature   


 


Pulse Rate 82 76 


 


Respiratory 18 18 





Rate   


 


Blood Pressure  128/94 128/94


 


O2 Sat by Pulse 95 97 





Oximetry   














  07/21/18





  12:13


 


Temperature 98.5 F


 


Pulse Rate 65


 


Respiratory 20





Rate 


 


Blood Pressure 119/90


 


O2 Sat by Pulse 95





Oximetry 














- Lab





 07/20/18 07:06





 07/21/18 07:27


 Most recent lab results











Calcium  8.4 mg/dL (8.4-10.2)   07/21/18  07:27    


 


Magnesium  2.00 mg/dL (1.7-2.3)   07/20/18  15:28

## 2018-07-21 NOTE — PROGRESS NOTE
Assessment and Plan





- Patient Problems


(1) Acute gastroenteritis


Current Visit: Yes   Status: Acute   





(2) Acute kidney injury


Current Visit: Yes   Status: Acute   





(3) Acute pancreatitis


Current Visit: Yes   Status: Acute   


Qualifiers: 


   Acute pancreatitis complication: unspecified 





(4) Dehydration


Current Visit: Yes   Status: Acute   





(5) Hypertension with renal disease


Current Visit: Yes   Status: Acute   





Subjective


Date of service: 07/21/18


Interval history: 





FEELS BETTER,,NO CV'





Objective


 Vital Signs











  Temp Pulse Resp BP Pulse Ox


 


 07/21/18 06:05   82  18   95


 


 07/20/18 22:52  98.7 F  82  18  130/91  93


 


 07/20/18 22:38   84   137/87 


 


 07/20/18 17:20  98.7 F  69  20  133/77  96


 


 07/20/18 13:11  99.5 F  76  20  130/99  99














- Physical Examination


HEENT: Positive: PERRL


Neck: Positive: neck supple, trachea midline


Cardiac: Positive: Reg Rate and Rhythm


Lungs: Positive: clear to auscultation


Neuro: Positive: Grossly Intact


Abdomen: Positive: Soft


Extremities: Absent: edema





- Labs and Meds


 Comprehensive Metabolic Panel











  07/21/18 Range/Units





  07:27 


 


Sodium  143  (137-145)  mmol/L


 


Potassium  3.4 L  (3.6-5.0)  mmol/L


 


Chloride  104.1  ()  mmol/L


 


Carbon Dioxide  26  (22-30)  mmol/L


 


BUN  17  (9-20)  mg/dL


 


Creatinine  1.8 H  (0.8-1.5)  mg/dL


 


Glucose  104 H  ()  mg/dL


 


Calcium  8.4  (8.4-10.2)  mg/dL














- Imaging and Cardiology


EKG: report reviewed (sinus rhythm supraventricular bigeminy left anterior 

fascicular block heart rate of 96)

## 2018-10-07 ENCOUNTER — HOSPITAL ENCOUNTER (INPATIENT)
Dept: HOSPITAL 5 - ED | Age: 53
LOS: 2 days | Discharge: HOME | DRG: 682 | End: 2018-10-09
Attending: INTERNAL MEDICINE | Admitting: INTERNAL MEDICINE
Payer: MEDICAID

## 2018-10-07 DIAGNOSIS — Z83.3: ICD-10-CM

## 2018-10-07 DIAGNOSIS — Z71.6: ICD-10-CM

## 2018-10-07 DIAGNOSIS — F14.10: ICD-10-CM

## 2018-10-07 DIAGNOSIS — Z82.49: ICD-10-CM

## 2018-10-07 DIAGNOSIS — M19.90: ICD-10-CM

## 2018-10-07 DIAGNOSIS — I25.2: ICD-10-CM

## 2018-10-07 DIAGNOSIS — I13.0: ICD-10-CM

## 2018-10-07 DIAGNOSIS — I50.43: ICD-10-CM

## 2018-10-07 DIAGNOSIS — F17.213: ICD-10-CM

## 2018-10-07 DIAGNOSIS — Z91.14: ICD-10-CM

## 2018-10-07 DIAGNOSIS — Z79.82: ICD-10-CM

## 2018-10-07 DIAGNOSIS — I42.0: ICD-10-CM

## 2018-10-07 DIAGNOSIS — Z79.899: ICD-10-CM

## 2018-10-07 DIAGNOSIS — Q21.1: ICD-10-CM

## 2018-10-07 DIAGNOSIS — N18.9: ICD-10-CM

## 2018-10-07 DIAGNOSIS — N17.9: Primary | ICD-10-CM

## 2018-10-07 DIAGNOSIS — K21.9: ICD-10-CM

## 2018-10-07 DIAGNOSIS — J96.01: ICD-10-CM

## 2018-10-07 DIAGNOSIS — E66.9: ICD-10-CM

## 2018-10-07 LAB
ALBUMIN SERPL-MCNC: 3.9 G/DL (ref 3.9–5)
ALT SERPL-CCNC: 27 UNITS/L (ref 7–56)
APTT BLD: 26.6 SEC. (ref 24.2–36.6)
BASOPHILS # (AUTO): 0.1 K/MM3 (ref 0–0.1)
BASOPHILS NFR BLD AUTO: 1 % (ref 0–1.8)
BENZODIAZEPINES SCREEN,URINE: (no result)
BILIRUB DIRECT SERPL-MCNC: < 0.2 MG/DL (ref 0–0.2)
BILIRUB UR QL STRIP: (no result)
BLOOD UR QL VISUAL: (no result)
BUN SERPL-MCNC: 15 MG/DL (ref 9–20)
BUN/CREAT SERPL: 9 %
CALCIUM SERPL-MCNC: 8.5 MG/DL (ref 8.4–10.2)
CREATININE,URINE: 70.4 MG/DL (ref 0.1–20)
EOSINOPHIL # BLD AUTO: 0.2 K/MM3 (ref 0–0.4)
EOSINOPHIL NFR BLD AUTO: 3.3 % (ref 0–4.3)
HCT VFR BLD CALC: 38.2 % (ref 35.5–45.6)
HEMOLYSIS INDEX: 7
HGB BLD-MCNC: 12.7 GM/DL (ref 11.8–15.2)
INR PPP: 0.87 (ref 0.87–1.13)
LYMPHOCYTES # BLD AUTO: 1.9 K/MM3 (ref 1.2–5.4)
LYMPHOCYTES NFR BLD AUTO: 29.9 % (ref 13.4–35)
MCH RBC QN AUTO: 31 PG (ref 28–32)
MCHC RBC AUTO-ENTMCNC: 33 % (ref 32–34)
MCV RBC AUTO: 94 FL (ref 84–94)
METHADONE SCREEN,URINE: (no result)
MONOCYTES # (AUTO): 0.5 K/MM3 (ref 0–0.8)
MONOCYTES % (AUTO): 7.9 % (ref 0–7.3)
OPIATE SCREEN,URINE: (no result)
PH UR STRIP: 6 [PH] (ref 5–7)
PLATELET # BLD: 213 K/MM3 (ref 140–440)
PROT UR STRIP-MCNC: (no result) MG/DL
RBC # BLD AUTO: 4.08 M/MM3 (ref 3.65–5.03)
RBC #/AREA URNS HPF: < 1 /HPF (ref 0–6)
UROBILINOGEN UR-MCNC: < 2 MG/DL (ref ?–2)
WBC #/AREA URNS HPF: < 1 /HPF (ref 0–6)

## 2018-10-07 PROCEDURE — 81001 URINALYSIS AUTO W/SCOPE: CPT

## 2018-10-07 PROCEDURE — 85379 FIBRIN DEGRADATION QUANT: CPT

## 2018-10-07 PROCEDURE — 82570 ASSAY OF URINE CREATININE: CPT

## 2018-10-07 PROCEDURE — 85730 THROMBOPLASTIN TIME PARTIAL: CPT

## 2018-10-07 PROCEDURE — 87116 MYCOBACTERIA CULTURE: CPT

## 2018-10-07 PROCEDURE — 71045 X-RAY EXAM CHEST 1 VIEW: CPT

## 2018-10-07 PROCEDURE — 80307 DRUG TEST PRSMV CHEM ANLYZR: CPT

## 2018-10-07 PROCEDURE — 36415 COLL VENOUS BLD VENIPUNCTURE: CPT

## 2018-10-07 PROCEDURE — 85027 COMPLETE CBC AUTOMATED: CPT

## 2018-10-07 PROCEDURE — 84300 ASSAY OF URINE SODIUM: CPT

## 2018-10-07 PROCEDURE — 82553 CREATINE MB FRACTION: CPT

## 2018-10-07 PROCEDURE — 82550 ASSAY OF CK (CPK): CPT

## 2018-10-07 PROCEDURE — 93005 ELECTROCARDIOGRAM TRACING: CPT

## 2018-10-07 PROCEDURE — 93010 ELECTROCARDIOGRAM REPORT: CPT

## 2018-10-07 PROCEDURE — 80048 BASIC METABOLIC PNL TOTAL CA: CPT

## 2018-10-07 PROCEDURE — 84484 ASSAY OF TROPONIN QUANT: CPT

## 2018-10-07 PROCEDURE — 99406 BEHAV CHNG SMOKING 3-10 MIN: CPT

## 2018-10-07 PROCEDURE — 80074 ACUTE HEPATITIS PANEL: CPT

## 2018-10-07 PROCEDURE — 4A033R1 MEASUREMENT OF ARTERIAL SATURATION, PERIPHERAL, PERCUTANEOUS APPROACH: ICD-10-PCS | Performed by: INTERNAL MEDICINE

## 2018-10-07 PROCEDURE — 96374 THER/PROPH/DIAG INJ IV PUSH: CPT

## 2018-10-07 PROCEDURE — 83880 ASSAY OF NATRIURETIC PEPTIDE: CPT

## 2018-10-07 PROCEDURE — 85025 COMPLETE CBC W/AUTO DIFF WBC: CPT

## 2018-10-07 PROCEDURE — 85610 PROTHROMBIN TIME: CPT

## 2018-10-07 RX ADMIN — Medication SCH: at 21:30

## 2018-10-07 RX ADMIN — SPIRONOLACTONE SCH MG: 25 TABLET ORAL at 18:11

## 2018-10-07 RX ADMIN — CARVEDILOL SCH MG: 12.5 TABLET, FILM COATED ORAL at 21:24

## 2018-10-07 RX ADMIN — HYDRALAZINE HYDROCHLORIDE SCH MG: 25 TABLET, FILM COATED ORAL at 21:24
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
## 2018-10-07 NOTE — XRAY REPORT
FINAL REPORT



PROCEDURE:  XR CHEST 1V AP



TECHNIQUE:  Chest radiograph anteroposterior view. CPT 56480







HISTORY:  MICHAEL. Short of breath. 



COMPARISON:  Prior chest x-ray 05/30/2018



FINDINGS:  

Heart: Cardiomegaly is unchanged..



Mediastinum/Vessels: Normal.



Lungs/Pleural space: Clear. No focal infiltrates masses effusions

or pneumothorax are visualized..



Bony thorax: No acute osseous abnormality.



Life support devices: None.



IMPRESSION:  

Stable cardiomegaly. No acute abnormality is seen..

## 2018-10-07 NOTE — HISTORY AND PHYSICAL REPORT
History of Present Illness


Chief complaint: 





Im bloated, and i cant breathe


History of present illness: 


52 YO Male with Obesity, CHF, HTN, MI, GERD, Nicotine Dependence presents to ED 

for evaluation. Pt states that he has experienced generalized edema and 

shortness of breath over the past 4 days with worsening symptoms over the past 

2 days. Pt acknowledges Orthopnea/PND, and decreased exercise tolerance. as 

well as dietary and medication noncompliance. Pt denies fever, chills, CP, 

Palpitations, NVD, Syncope,Trauma, BRBPR, Productive cough, skin rash, 

hemoptysis, or recent ill contacts. Pt seen and evaluated in ED and found to 

have symptoms consistent with CHF Decompensation, ARF, as well as acute 

respiratory failure. Pt admitted to telemetry, and initiated on CHF protocol. 

Cardiology team consulted in ED.  











Past History


Past Medical History: acute MI, GERD, heart failure, hypertension


Past Surgical History: Other (Eye surgery)


Social history: , lives with family, smoking


Family history: diabetes, hypertension





Medications and Allergies


 Allergies











Allergy/AdvReac Type Severity Reaction Status Date / Time


 


No Known Allergies Allergy   Verified 12/27/17 16:12











 Home Medications











 Medication  Instructions  Recorded  Confirmed  Last Taken  Type


 


ALBUTEROL Inhaler (OR & NICU) 1 - 2 puff IH Q4-6H PRN #1 inha 10/06/17 05/30/18 

Unknown Rx





[ProAir HFA Inhaler]     


 


Carvedilol [Coreg] 12.5 mg PO BID 05/30/18 05/30/18 05/28/18 History


 


Pantoprazole [Protonix TAB] 40 mg PO QDAY 05/30/18 05/30/18 05/28/18 History


 


Potassium Chloride [K-Dur] 10 meq PO QDAY 05/30/18 05/30/18 05/28/18 History


 


Aspirin [Aspirin TAB] 325 mg PO QDAY #30 tablet 05/31/18  Unknown Rx


 


Spironolactone [Aldactone] 25 mg PO QDAY #30 tablet 05/31/18  Unknown Rx


 


Torsemide [Demadex] 20 mg PO DAILY #30 tablet 05/31/18  Unknown Rx


 


Hydralazine HCl 50 mg PO BID #60 tablet 07/21/18  Unknown Rx














Review of Systems


Constitutional: weight gain, no weight loss, no fever, no chills


Ears, nose, mouth and throat: no ear pain, no ear discharge, no tinnitis, no 

decreased hearing, no nose pain


Cardiovascular: orthopnea, shortness of breath, paroxysmal nocturnal dyspnea, 

leg edema, decreased exercise tolerance, no chest pain, no palpitations, no 

rapid/irregular heart beat, no edema, no syncope


Respiratory: no cough, no cough with sputum, no excessive sputum, no hemoptysis


Gastrointestinal: no nausea, no vomiting, no diarrhea, no constipation


Genitourinary Male: no hematuria, no flank pain, no discharge, no urinary 

frequency, no urinary hesitancy


Rectal: no pain, no incontinence, no bleeding


Musculoskeletal: no neck stiffness, no neck pain, no shooting arm pain


Integumentary: no rash, no pruritis, no redness, no wounds, no jaundice


Neurological: no transient paralysis, no paralysis, no weakness, no parathesias

, no numbness, no tingling


Psychiatric: no anxiety, no memory loss, no change in sleep habits, no sleep 

disturbances, no insomnia, no hypersomnia, no change in appetite


Endocrine: no cold intolerance, no heat intolerance, no polyphagia, no 

excessive thirst, no polydipsia, no polyuria, no nocturia


Hematologic/Lymphatic: no easy bruising, no easy bleeding, no lymphadenopathy, 

no lymphedema


Allergic/Immunologic: no urticaria, no allergic rhinitis, no wheezing, no 

persistent infections, no anaphylaxis, no angioedema





Exam





- Constitutional


Vitals: 


 











Temp Pulse Resp BP Pulse Ox


 


 98.3 F   87   22   140/106   96 


 


 10/07/18 13:24  10/07/18 14:10  10/07/18 13:52  10/07/18 14:10  10/07/18 13:52











General appearance: Present: mild distress, obese





- EENT


Eyes: Present: PERRL


ENT: hearing intact, clear oral mucosa





- Neck


Neck: Present: supple, normal ROM





- Respiratory


Respiratory effort: normal


Respiratory: bilateral: diminished, rhonchi





- Cardiovascular


Heart Sounds: Present: S1 & S2.  Absent: rub, click





- Extremities


Extremities: pulses symmetrical, No edema


Extremity abnormal: edema


Peripheral Pulses: within normal limits





- Abdominal


General gastrointestinal: Present: soft, non-tender, non-distended, normal 

bowel sounds


Male genitourinary: Present: normal





- Integumentary


Integumentary: Present: clear, warm, dry





- Musculoskeletal


Musculoskeletal: gait normal, strength equal bilaterally





- Psychiatric


Psychiatric: appropriate mood/affect, intact judgment & insight





- Neurologic


Neurologic: CNII-XII intact, moves all extremities





Results





- Labs


CBC & Chem 7: 


 10/07/18 13:31





 10/07/18 13:31


Labs: 


 Abnormal lab results











  10/07/18 10/07/18 10/07/18 Range/Units





  13:31 13:31 13:31 


 


RDW   16.0 H   (13.2-15.2)  %


 


Mono % (Auto)   7.9 H   (0.0-7.3)  %


 


Sodium  146 H    (137-145)  mmol/L


 


Creatinine  1.6 H    (0.8-1.5)  mg/dL


 


Glucose  104 H    ()  mg/dL


 


NT-Pro-B Natriuret Pep    3140 H  (0-900)  pg/mL














Assessment and Plan





- Patient Problems


(1) CHF (congestive heart failure)


Current Visit: Yes   Status: Chronic   


Qualifiers: 


   Heart failure type: systolic   Heart failure chronicity: acute on chronic   

Qualified Code(s): I50.23 - Acute on chronic systolic (congestive) heart 

failure   


Plan to address problem: 


Admit to telemetry, strict I/O, diuresis, monitor uop q shift, restart home 

medication, Pt counseled regarding noncompliance, daily weight, bnp, d dimer, 

chest x ray, recent echo 7/18 reviewed. 








(2) Respiratory failure


Current Visit: Yes   Status: Acute   





(3) Nicotine dependence unspecified, with withdrawal


Current Visit: Yes   Status: Acute   





(4) Acute on chronic renal failure


Current Visit: No   Status: Acute   





(5) DVT prophylaxis


Current Visit: No   Status: Acute

## 2018-10-08 RX ADMIN — NICOTINE SCH MG: 14 PATCH TRANSDERMAL at 21:53

## 2018-10-08 RX ADMIN — ASPIRIN SCH MG: 325 TABLET ORAL at 09:51

## 2018-10-08 RX ADMIN — HYDRALAZINE HYDROCHLORIDE SCH MG: 25 TABLET, FILM COATED ORAL at 09:52

## 2018-10-08 RX ADMIN — Medication SCH ML: at 21:53

## 2018-10-08 RX ADMIN — PANTOPRAZOLE SODIUM SCH MG: 40 TABLET, DELAYED RELEASE ORAL at 09:51

## 2018-10-08 RX ADMIN — METOLAZONE SCH MG: 5 TABLET ORAL at 16:00

## 2018-10-08 RX ADMIN — CARVEDILOL SCH MG: 12.5 TABLET, FILM COATED ORAL at 21:53

## 2018-10-08 RX ADMIN — SPIRONOLACTONE SCH MG: 25 TABLET ORAL at 09:51

## 2018-10-08 RX ADMIN — POTASSIUM CHLORIDE SCH MEQ: 10 TABLET, EXTENDED RELEASE ORAL at 09:53

## 2018-10-08 RX ADMIN — CARVEDILOL SCH MG: 12.5 TABLET, FILM COATED ORAL at 09:52

## 2018-10-08 RX ADMIN — HYDRALAZINE HYDROCHLORIDE SCH MG: 25 TABLET, FILM COATED ORAL at 21:53

## 2018-10-08 RX ADMIN — Medication SCH ML: at 09:54

## 2018-10-08 RX ADMIN — TORSEMIDE SCH MG: 10 TABLET ORAL at 13:31

## 2018-10-08 NOTE — PROGRESS NOTE
Assessment and Plan


Assessment and plan: 





Acute systolic heart failure exacerbation.  Patient with echocardiogram in July 2018 that revealed EF of 15-20%.  Consult cardiology for further evaluation.  

Continue afterload reduction, beta blocker and diuresis.





Acute hypoxemic respiratory failure.  Etiology secondary to above.  Continue O2 

for supportive care.





Nonischemic Dilated cardiomyopathy.  As above.  Recent MPI revealed large fixed 

inferior wall defect unchanged from previous.  Patient with normal coronaries 

by cath [facility] years ago at [facility].  Patient reportedly declined a LifeVest and 

AICD therapy previously.





Patent foramen ovale





Hypertension.  Continue anti-hypertensive medications.





Medical noncompliance.  Patient reports being out of town working in [place] 

[place] for the [employer] relief and states that he may have gotten his 

medications mixed up.





Tobacco dependence.  Patient will be counseled on smoking cessation.





Acute on CKD.  Patient appears to have baseline creatinine of 1.2-1.4.  

Consider nephrology consultation.  Recheck BMP in the morning.











History


Interval history: 





No new issues overnight.





Hospitalist Physical





- Constitutional


Vitals: 


 











Temp Pulse Resp BP Pulse Ox


 


 98.0 F   62   20   142/101   95 


 


 10/08/18 08:22  10/08/18 09:52  10/08/18 08:22  10/08/18 09:52  10/08/18 08:22











General appearance: Present: no acute distress, obese





- EENT


Eyes: Present: PERRL, EOM intact


ENT: hearing intact, clear oral mucosa, dentition normal





- Neck


Neck: Present: supple, normal ROM





- Respiratory


Respiratory effort: normal


Respiratory: bilateral: CTA





- Cardiovascular


Rhythm: regular


Heart Sounds: Present: S1 & S2.  Absent: gallop, rub





- Extremities


Extremities: no ischemia, No edema, Full ROM





- Abdominal


General gastrointestinal: soft, non-tender, non-distended, normal bowel sounds





- Integumentary


Integumentary: Present: clear, warm, dry





- Neurologic


Neurologic: CNII-XII intact, moves all extremities





Results





- Labs


CBC & Chem 7: 


 10/07/18 13:31





 10/07/18 13:31


Labs: 


 Laboratory Last Values











WBC  6.2 K/mm3 (4.5-11.0)   10/07/18  13:31    


 


RBC  4.08 M/mm3 (3.65-5.03)   10/07/18  13:31    


 


Hgb  12.7 gm/dl (11.8-15.2)   10/07/18  13:31    


 


Hct  38.2 % (35.5-45.6)   10/07/18  13:31    


 


MCV  94 fl (84-94)   10/07/18  13:31    


 


MCH  31 pg (28-32)   10/07/18  13:31    


 


MCHC  33 % (32-34)   10/07/18  13:31    


 


RDW  16.0 % (13.2-15.2)  H  10/07/18  13:31    


 


Plt Count  213 K/mm3 (140-440)   10/07/18  13:31    


 


Lymph % (Auto)  29.9 % (13.4-35.0)   10/07/18  13:31    


 


Mono % (Auto)  7.9 % (0.0-7.3)  H  10/07/18  13:31    


 


Eos % (Auto)  3.3 % (0.0-4.3)   10/07/18  13:31    


 


Baso % (Auto)  1.0 % (0.0-1.8)   10/07/18  13:31    


 


Lymph #  1.9 K/mm3 (1.2-5.4)   10/07/18  13:31    


 


Mono #  0.5 K/mm3 (0.0-0.8)   10/07/18  13:31    


 


Eos #  0.2 K/mm3 (0.0-0.4)   10/07/18  13:31    


 


Baso #  0.1 K/mm3 (0.0-0.1)   10/07/18  13:31    


 


Seg Neutrophils %  57.9 % (40.0-70.0)   10/07/18  13:31    


 


Seg Neutrophils #  3.6 K/mm3 (1.8-7.7)   10/07/18  13:31    


 


PT  12.3 Sec. (12.2-14.9)   10/07/18  14:00    


 


INR  0.87  (0.87-1.13)   10/07/18  14:00    


 


APTT  26.6 Sec. (24.2-36.6)   10/07/18  14:00    


 


D-Dimer  329.90 ng/mlDDU (0-234)  H  10/07/18  14:00    


 


Sodium  146 mmol/L (137-145)  H  10/07/18  13:31    


 


Potassium  4.0 mmol/L (3.6-5.0)   10/07/18  13:31    


 


Chloride  105.0 mmol/L ()   10/07/18  13:31    


 


Carbon Dioxide  28 mmol/L (22-30)   10/07/18  13:31    


 


Anion Gap  17 mmol/L  10/07/18  13:31    


 


BUN  15 mg/dL (9-20)   10/07/18  13:31    


 


Creatinine  1.6 mg/dL (0.8-1.5)  H  10/07/18  13:31    


 


Estimated GFR  55 ml/min  10/07/18  13:31    


 


BUN/Creatinine Ratio  9 %  10/07/18  13:31    


 


Glucose  104 mg/dL ()  H  10/07/18  13:31    


 


Calcium  8.5 mg/dL (8.4-10.2)   10/07/18  13:31    


 


Total Bilirubin  0.50 mg/dL (0.1-1.2)   10/07/18  14:00    


 


Direct Bilirubin  < 0.2 mg/dL (0-0.2)   10/07/18  14:00    


 


AST  24 units/L (5-40)   10/07/18  14:00    


 


ALT  27 units/L (7-56)   10/07/18  14:00    


 


Alkaline Phosphatase  84 units/L ()   10/07/18  14:00    


 


Total Creatine Kinase  390 units/L ()  H  10/07/18  17:54    


 


CK-MB (CK-2)  4.4 ng/mL (0.0-4.0)  H  10/07/18  17:54    


 


CK-MB (CK-2) Rel Index  1.1  (0-4)   10/07/18  17:54    


 


Troponin T  0.011 ng/mL (0.00-0.029)   10/07/18  13:31    


 


NT-Pro-B Natriuret Pep  3140 pg/mL (0-900)  H  10/07/18  13:31    


 


Total Protein  6.4 g/dL (6.3-8.2)   10/07/18  14:00    


 


Albumin  3.9 g/dL (3.9-5)   10/07/18  14:00    


 


Albumin/Globulin Ratio  1.6 %  10/07/18  14:00    


 


Urine Color  Colorless  (Yellow)   10/07/18  13:55    


 


Urine Turbidity  Clear  (Clear)   10/07/18  13:55    


 


Urine pH  6.0  (5.0-7.0)   10/07/18  13:55    


 


Ur Specific Gravity  1.005  (1.003-1.030)   10/07/18  13:55    


 


Urine Protein  <15 mg/dl mg/dL (Negative)   10/07/18  13:55    


 


Urine Glucose (UA)  Neg mg/dL (Negative)   10/07/18  13:55    


 


Urine Ketones  Neg mg/dL (Negative)   10/07/18  13:55    


 


Urine Blood  Neg  (Negative)   10/07/18  13:55    


 


Urine Nitrite  Neg  (Negative)   10/07/18  13:55    


 


Urine Bilirubin  Neg  (Negative)   10/07/18  13:55    


 


Urine Urobilinogen  < 2.0 mg/dL (<2.0)   10/07/18  13:55    


 


Ur Leukocyte Esterase  Neg  (Negative)   10/07/18  13:55    


 


Urine WBC (Auto)  < 1.0 /HPF (0.0-6.0)   10/07/18  13:55    


 


Urine RBC (Auto)  < 1.0 /HPF (0.0-6.0)   10/07/18  13:55    


 


Urine Creatinine  70.4 mg/dL (0.1-20.0)  H  10/07/18  21:53    


 


Urine Sodium  140 mmol/L  10/07/18  21:53    


 


Urine Opiates Screen  Presumptive negative   10/07/18  13:55    


 


Urine Methadone Screen  Presumptive negative   10/07/18  13:55    


 


Ur Barbiturates Screen  Presumptive negative   10/07/18  13:55    


 


Ur Phencyclidine Scrn  Presumptive negative   10/07/18  13:55    


 


Ur Amphetamines Screen  Presumptive negative   10/07/18  13:55    


 


U Benzodiazepines Scrn  Presumptive negative   10/07/18  13:55    


 


Urine Cocaine Screen  Presumptive positive   10/07/18  13:55    


 


U Marijuana (THC) Screen  Presumptive negative   10/07/18  13:55    


 


Drugs of Abuse Note  Disclamer   10/07/18  13:55

## 2018-10-09 VITALS — DIASTOLIC BLOOD PRESSURE: 97 MMHG | SYSTOLIC BLOOD PRESSURE: 131 MMHG

## 2018-10-09 LAB
BUN SERPL-MCNC: 17 MG/DL (ref 9–20)
BUN/CREAT SERPL: 11 %
CALCIUM SERPL-MCNC: 9 MG/DL (ref 8.4–10.2)
HCT VFR BLD CALC: 40.2 % (ref 35.5–45.6)
HEMOLYSIS INDEX: 15
HGB BLD-MCNC: 13.2 GM/DL (ref 11.8–15.2)
MCH RBC QN AUTO: 31 PG (ref 28–32)
MCHC RBC AUTO-ENTMCNC: 33 % (ref 32–34)
MCV RBC AUTO: 94 FL (ref 84–94)
PLATELET # BLD: 203 K/MM3 (ref 140–440)
RBC # BLD AUTO: 4.29 M/MM3 (ref 3.65–5.03)

## 2018-10-09 RX ADMIN — CARVEDILOL SCH MG: 12.5 TABLET, FILM COATED ORAL at 10:18

## 2018-10-09 RX ADMIN — PANTOPRAZOLE SODIUM SCH MG: 40 TABLET, DELAYED RELEASE ORAL at 10:19

## 2018-10-09 RX ADMIN — METOLAZONE SCH MG: 5 TABLET ORAL at 10:19

## 2018-10-09 RX ADMIN — TORSEMIDE SCH MG: 10 TABLET ORAL at 10:16

## 2018-10-09 RX ADMIN — NICOTINE SCH: 14 PATCH TRANSDERMAL at 10:20

## 2018-10-09 RX ADMIN — POTASSIUM CHLORIDE SCH MEQ: 10 TABLET, EXTENDED RELEASE ORAL at 10:17

## 2018-10-09 RX ADMIN — Medication SCH ML: at 10:20

## 2018-10-09 RX ADMIN — HYDRALAZINE HYDROCHLORIDE SCH MG: 25 TABLET, FILM COATED ORAL at 10:18

## 2018-10-09 RX ADMIN — SPIRONOLACTONE SCH MG: 25 TABLET ORAL at 10:17

## 2018-10-09 RX ADMIN — ASPIRIN SCH MG: 325 TABLET ORAL at 10:17

## 2018-10-09 NOTE — PROGRESS NOTE
Assessment and Plan





Acute decompensated systolic heart failure


   s/t non-compliance


Non-ischemic cardiomyopathy, LVEF 15%


   MPI 05/2018 - large fixed inferior wall defect - unchanged from previous


   Normal coronaries by cath at [facility] several years ago


Hypertension


Non-compliance





Recommendations:


Continue diuretics, afterload reducing agents and beta blocker therapy.


Okay to discharge home, cardiac wise.





Subjective


Date of service: 10/09/18


Interval history: 





Patient reports he is feeling better. Wants to go home.





Objective


 Vital Signs











  Temp Pulse Resp BP BP Pulse Ox


 


 10/09/18 12:06  97.9 F  80  20   131/97  97


 


 10/09/18 10:18   89   146/93  


 


 10/09/18 10:17   89   146/93  


 


 10/09/18 07:35  97.6 F  87  20   146/93  94


 


 10/09/18 05:19  97.9 F  70  24  126/96   98


 


 10/09/18 00:28  98.3 F  88  24  126/92   94


 


 10/08/18 22:00   84    


 


 10/08/18 19:51  98.3 F  86  22  153/109   100


 


 10/08/18 16:09   65  20  159/106   97


 


 10/08/18 14:02       98














- Physical Examination


General: No Apparent Distress


HEENT: Positive: PERRL (left)


Cardiac: Positive: Reg Rate and Rhythm


Lungs: Positive: Decreased Breath Sounds


Neuro: Positive: Grossly Intact





- Labs and Meds


 CBC











  10/09/18 Range/Units





  09:18 


 


WBC  6.3  (4.5-11.0)  K/mm3


 


RBC  4.29  (3.65-5.03)  M/mm3


 


Hgb  13.2  (11.8-15.2)  gm/dl


 


Hct  40.2  (35.5-45.6)  %


 


Plt Count  203  (140-440)  K/mm3








 Comprehensive Metabolic Panel











  10/09/18 Range/Units





  09:18 


 


Sodium  140  (137-145)  mmol/L


 


Potassium  3.5 L  (3.6-5.0)  mmol/L


 


Chloride  99.4  ()  mmol/L


 


Carbon Dioxide  28  (22-30)  mmol/L


 


BUN  17  (9-20)  mg/dL


 


Creatinine  1.5  (0.8-1.5)  mg/dL


 


Glucose  171 H  ()  mg/dL


 


Calcium  9.0  (8.4-10.2)  mg/dL

## 2018-10-09 NOTE — DISCHARGE SUMMARY
Providers





- Providers


Date of Admission: 


10/07/18 16:14





Date of discharge: 10/09/18


Attending physician: 


AMBROSIO PFEIFFER





 





10/07/18 17:23


Consult to Physician [CONS] Routine 


   Comment: 


   Consulting Provider: ELSA LAKE


   Physician Instructions: 


   Reason For Exam: CHF











Primary care physician: 


PRIMARY CARE MD








Hospitalization


Condition: Fair


Disposition: DC-01 TO HOME OR SELFCARE





Exam





- Constitutional


Vitals: 


 











Temp Pulse Resp BP Pulse Ox


 


 97.6 F   89   20   146/93   94 


 


 10/09/18 07:35  10/09/18 10:18  10/09/18 07:35  10/09/18 10:18  10/09/18 07:35














Plan


Activity: advance as tolerated


Diet: low fat, low cholesterol, low salt


Additional Instructions: 1.Follow up with PCP or [facility] in 1 week.  

2.Follow up with Dr. Lake, cardiology in 1 week


Follow up with: 


PRIMARY CARE,MD [Primary Care Provider] - 3-5 Days


Prescriptions: 


metOLazone [Zaroxolyn] 5 mg PO QDAY #30 tablet

## 2019-06-05 ENCOUNTER — HOSPITAL ENCOUNTER (EMERGENCY)
Dept: HOSPITAL 5 - ED | Age: 54
Discharge: HOME | End: 2019-06-05
Payer: MEDICAID

## 2019-06-05 VITALS — DIASTOLIC BLOOD PRESSURE: 116 MMHG | SYSTOLIC BLOOD PRESSURE: 145 MMHG

## 2019-06-05 DIAGNOSIS — I50.9: ICD-10-CM

## 2019-06-05 DIAGNOSIS — J40: Primary | ICD-10-CM

## 2019-06-05 DIAGNOSIS — I11.0: ICD-10-CM

## 2019-06-05 DIAGNOSIS — F17.200: ICD-10-CM

## 2019-06-05 DIAGNOSIS — K21.9: ICD-10-CM

## 2019-06-05 LAB
BASOPHILS # (AUTO): 0 K/MM3 (ref 0–0.1)
BASOPHILS NFR BLD AUTO: 0.6 % (ref 0–1.8)
BUN SERPL-MCNC: 11 MG/DL (ref 9–20)
BUN/CREAT SERPL: 9 %
CALCIUM SERPL-MCNC: 9 MG/DL (ref 8.4–10.2)
EOSINOPHIL # BLD AUTO: 0.1 K/MM3 (ref 0–0.4)
EOSINOPHIL NFR BLD AUTO: 1.8 % (ref 0–4.3)
HCT VFR BLD CALC: 40.3 % (ref 35.5–45.6)
HEMOLYSIS INDEX: 5
HGB BLD-MCNC: 13.4 GM/DL (ref 11.8–15.2)
LYMPHOCYTES # BLD AUTO: 1.1 K/MM3 (ref 1.2–5.4)
LYMPHOCYTES NFR BLD AUTO: 14.7 % (ref 13.4–35)
MCHC RBC AUTO-ENTMCNC: 33 % (ref 32–34)
MCV RBC AUTO: 92 FL (ref 84–94)
MONOCYTES # (AUTO): 0.5 K/MM3 (ref 0–0.8)
MONOCYTES % (AUTO): 6.8 % (ref 0–7.3)
PLATELET # BLD: 205 K/MM3 (ref 140–440)
RBC # BLD AUTO: 4.37 M/MM3 (ref 3.65–5.03)

## 2019-06-05 PROCEDURE — 80048 BASIC METABOLIC PNL TOTAL CA: CPT

## 2019-06-05 PROCEDURE — 85025 COMPLETE CBC W/AUTO DIFF WBC: CPT

## 2019-06-05 PROCEDURE — 96374 THER/PROPH/DIAG INJ IV PUSH: CPT

## 2019-06-05 PROCEDURE — 36415 COLL VENOUS BLD VENIPUNCTURE: CPT

## 2019-06-05 PROCEDURE — 93010 ELECTROCARDIOGRAM REPORT: CPT

## 2019-06-05 PROCEDURE — 93005 ELECTROCARDIOGRAM TRACING: CPT

## 2019-06-05 PROCEDURE — 94640 AIRWAY INHALATION TREATMENT: CPT

## 2019-06-05 PROCEDURE — 71045 X-RAY EXAM CHEST 1 VIEW: CPT

## 2019-06-05 PROCEDURE — 71250 CT THORAX DX C-: CPT

## 2019-06-05 PROCEDURE — 84484 ASSAY OF TROPONIN QUANT: CPT

## 2019-06-05 PROCEDURE — 83880 ASSAY OF NATRIURETIC PEPTIDE: CPT

## 2019-06-05 PROCEDURE — 99285 EMERGENCY DEPT VISIT HI MDM: CPT

## 2019-06-05 NOTE — CAT SCAN REPORT
PROCEDURE:  CT CHEST WO CON 

 

TECHNIQUE:  Computerized axial tomography of the chest was performed without contrast material. This 
study is performed without intravenous contrast and the sensitivity for pathology, including neoplasm
s, adenopathy, abscess, pulmonary embolism and aortic dissection, is reduced.   

 

CT DOSE LENGTH PRODUCT:  1172.7  mGycm 

 

HISTORY: SOB, r/o pneumonia, pulm edema 

 

COMPARISONS:  CT angiogram of the chest performed on 10/5/2017 . 

 

FINDINGS: 

 

Heart and pericardium: Marked cardiomegaly. 

Thoracic aorta:  Normal noncontrast appearance. Conventional branching pattern of the aortic arch. 

Pulmonary vasculature: Normal noncontrast appearance. 

Lymph nodes:  Subcentimeter lymph nodes in the pretracheal, aortopulmonary window, prevascular, and s
ubcarinal regions of the mediastinum. 

Lungs:  Groundglass opacities as well as tree-in-bud opacities in the perihilar areas of the right lo
wer lobe, right middle lobe, and right upper lobe. Background of centrilobular and paraseptal emphyse
matous change. No significant interlobular septal thickening. 

Pleural space:  No effusion, thickening, or pneumothorax. 

Musculoskeletal structures:  No significant abnormality. 

Upper abdominal structures:  No significant abnormality. 

 

IMPRESSION:   

 

Scattered groundglass opacities and tree-in-bud opacities in the perihilar areas of the right upper l
obe, right middle lobe, and right lower lobe, most likely due to infectious or inflammatory process. 
No intralobular septal thickening to suggest pulmonary edema. 

 

Unchanged marked cardiomegaly. 

 

Background centrilobular and paraseptal emphysematous change. 

 

This document is electronically signed by Claire Perez MD., June 5 2019 01:47:40 PM ET

## 2019-06-05 NOTE — XRAY REPORT
Single view chest: Compared to 10/7/18.



History: Chest pain.

Findings:



Cardiomegaly. Trachea is midline. No consolidation, pneumothorax or 

pleural effusion.



Impression:



Cardiomegaly. No acute lung changes.
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
## 2020-01-01 NOTE — CONSULTATION
History of Present Illness


Consult date: 10/08/18


Consult reason: congestive heart failure


History of present illness: 





Mr Blue is a 53 year old man with a long history of dilated nonischemic 

cardiomyopathy. His latest echocardiogram documents a severe left ventricular 

dysfunction, ejection fraction 15-20%. He presented with shortness of breath, 

lower extremity edema admitted with decompensated heart failure. Patient admits 

with noncompliance with his medications and dietary restrictions. His ECG shows 

sinus rhythm with LVH of repolarization abnormalities. Cardiology consultation 

was requested for CHF management.





Past History


Social history: , lives with family, smoking


Family history: diabetes, hypertension





Medications and Allergies


 Allergies











Allergy/AdvReac Type Severity Reaction Status Date / Time


 


No Known Allergies Allergy   Verified 12/27/17 16:12











 Home Medications











 Medication  Instructions  Recorded  Confirmed  Last Taken  Type


 


ALBUTEROL Inhaler (OR & NICU) 1 - 2 puff IH Q4-6H PRN #1 inha 10/06/17 10/08/18 

Unknown Rx





[ProAir HFA Inhaler]     


 


Carvedilol [Coreg] 12.5 mg PO BID 05/30/18 10/08/18 05/28/18 History


 


Pantoprazole [Protonix TAB] 40 mg PO QDAY 05/30/18 10/08/18 05/28/18 History


 


Potassium Chloride [K-Dur] 10 meq PO QDAY 05/30/18 10/08/18 05/28/18 History


 


Aspirin [Aspirin TAB] 325 mg PO QDAY #30 tablet 05/31/18 10/08/18 Unknown Rx


 


Spironolactone [Aldactone] 25 mg PO QDAY #30 tablet 05/31/18 10/08/18 Unknown Rx


 


Torsemide [Demadex] 20 mg PO DAILY #30 tablet 05/31/18 10/08/18 Unknown Rx


 


Hydralazine HCl 50 mg PO BID #60 tablet 07/21/18 10/08/18 Unknown Rx











Active Meds: 


Active Medications





Acetaminophen (Tylenol)  650 mg PO Q4H PRN


   PRN Reason: Pain MILD(1-3)/Fever >100.5/HA


Albuterol (Proventil)  2.5 mg IH Q4H PRN


   PRN Reason: Shortness Of Breath


Aspirin (Aspirin)  325 mg PO QDAY [facility]


   Last Admin: 10/08/18 09:51 Dose:  325 mg


Carvedilol (Coreg)  12.5 mg PO BID [facility]


   Last Admin: 10/08/18 09:52 Dose:  12.5 mg


Hydralazine HCl (Apresoline)  50 mg PO BID [facility]


   Last Admin: 10/08/18 09:52 Dose:  50 mg


Ondansetron HCl (Zofran)  4 mg IV Q8H PRN


   PRN Reason: Nausea And Vomiting


Pantoprazole Sodium (Protonix)  40 mg PO QDAY [facility]


   Last Admin: 10/08/18 09:51 Dose:  40 mg


Potassium Chloride (K-Dur)  10 meq PO QDAY [facility]


   Last Admin: 10/08/18 09:53 Dose:  10 meq


Sodium Chloride (Sodium Chloride Flush Syringe 10 Ml)  10 ml IV BID [facility]


   Last Admin: 10/08/18 09:54 Dose:  10 ml


Sodium Chloride (Sodium Chloride Flush Syringe 10 Ml)  10 ml IV PRN PRN


   PRN Reason: LINE FLUSH


Spironolactone (Aldactone)  25 mg PO QDAY [facility]


   Last Admin: 10/08/18 09:51 Dose:  25 mg


Torsemide (Demadex)  20 mg PO DAILY [facility]











Physical Examination


 Vital Signs











Temp Pulse Resp BP Pulse Ox


 


 98.3 F   86   20   175/111   94 


 


 10/07/18 13:24  10/07/18 13:24  10/07/18 13:24  10/07/18 13:24  10/07/18 13:24











General appearance: no acute distress


HEENT: Positive: PERRL


Cardiac: Positive: Reg Rate and Rhythm


Lungs: Positive: Decreased Breath Sounds


Neuro: Positive: Grossly Intact


Extremities: Present: +1 Edema





Results





 10/07/18 13:31





 10/07/18 13:31


 Cardiac Enzymes











  10/07/18 10/07/18 Range/Units





  14:00 17:54 


 


AST  24   (5-40)  units/L


 


CK-MB (CK-2)   4.4 H  (0.0-4.0)  ng/mL








 Coagulation











  10/07/18 Range/Units





  14:00 


 


PT  12.3  (12.2-14.9)  Sec.


 


INR  0.87  (0.87-1.13)  


 


APTT  26.6  (24.2-36.6)  Sec.








 CBC











  10/07/18 Range/Units





  13:31 


 


WBC  6.2  (4.5-11.0)  K/mm3


 


RBC  4.08  (3.65-5.03)  M/mm3


 


Hgb  12.7  (11.8-15.2)  gm/dl


 


Hct  38.2  (35.5-45.6)  %


 


Plt Count  213  (140-440)  K/mm3


 


Lymph #  1.9  (1.2-5.4)  K/mm3


 


Mono #  0.5  (0.0-0.8)  K/mm3


 


Eos #  0.2  (0.0-0.4)  K/mm3


 


Baso #  0.1  (0.0-0.1)  K/mm3








 Comprehensive Metabolic Panel











  10/07/18 10/07/18 Range/Units





  13:31 14:00 


 


Sodium  146 H   (137-145)  mmol/L


 


Potassium  4.0   (3.6-5.0)  mmol/L


 


Chloride  105.0   ()  mmol/L


 


Carbon Dioxide  28   (22-30)  mmol/L


 


BUN  15   (9-20)  mg/dL


 


Creatinine  1.6 H   (0.8-1.5)  mg/dL


 


Glucose  104 H   ()  mg/dL


 


Calcium  8.5   (8.4-10.2)  mg/dL


 


Direct Bilirubin   < 0.2  (0-0.2)  mg/dL


 


AST   24  (5-40)  units/L


 


ALT   27  (7-56)  units/L


 


Alkaline Phosphatase   84  ()  units/L


 


Total Protein   6.4  (6.3-8.2)  g/dL


 


Albumin   3.9  (3.9-5)  g/dL














Assessment and Plan





Acute decompensated systolic heart failure


Non-ischemic cardiomyopathy, LVEF 15%


   MPI 05/2018 - large fixed inferior wall defect - unchanged from previous


   Normal coronaries by cath at [facility] several years ago


Hypertension


Non-compliance [TextBox_4] : History obtained from parents.\par \par History of prenatal hydronephrosis.A kidney/bladder ultrasound was completed on 1/27/20 and it demonstrated normal sonographic appearance of the kidneys and urinary bladder.  A repeat ultrasound was completed at [facility] (2/24/20) which demonstrated mild left-sided hydronephrosis and mild fullness of the right renal collecting system.  Upon review of the images he has bilateral grade 1 hydronephrosis. Not on antibiotic suppression.  No associated signs or symptoms. No aggravating or relieving factors. Insidious onset. No history of UTI, genital infections or other urologic issues. No other previous pertinent radiographic imaging.

## 2021-02-10 ENCOUNTER — HOSPITAL ENCOUNTER (OUTPATIENT)
Dept: HOSPITAL 5 - ED | Age: 56
Setting detail: OBSERVATION
Discharge: HOME | End: 2021-02-10
Attending: INTERNAL MEDICINE | Admitting: INTERNAL MEDICINE
Payer: MEDICAID

## 2021-02-10 VITALS — DIASTOLIC BLOOD PRESSURE: 78 MMHG | SYSTOLIC BLOOD PRESSURE: 107 MMHG

## 2021-02-10 DIAGNOSIS — F17.200: ICD-10-CM

## 2021-02-10 DIAGNOSIS — J44.1: ICD-10-CM

## 2021-02-10 DIAGNOSIS — M19.90: ICD-10-CM

## 2021-02-10 DIAGNOSIS — Z98.890: ICD-10-CM

## 2021-02-10 DIAGNOSIS — I50.9: ICD-10-CM

## 2021-02-10 DIAGNOSIS — I11.0: ICD-10-CM

## 2021-02-10 DIAGNOSIS — K21.9: ICD-10-CM

## 2021-02-10 DIAGNOSIS — Z79.82: ICD-10-CM

## 2021-02-10 DIAGNOSIS — M00.9: Primary | ICD-10-CM

## 2021-02-10 DIAGNOSIS — N17.9: ICD-10-CM

## 2021-02-10 LAB
ALBUMIN SERPL-MCNC: 3.9 G/DL (ref 3.9–5)
ALT SERPL-CCNC: 16 UNITS/L (ref 7–56)
APTT BLD: 26.9 SEC. (ref 24.2–36.6)
BASOPHILS # (AUTO): 0.1 K/MM3 (ref 0–0.1)
BASOPHILS NFR BLD AUTO: 0.6 % (ref 0–1.8)
BILIRUB DIRECT SERPL-MCNC: 0.4 MG/DL (ref 0–0.2)
BUN SERPL-MCNC: 12 MG/DL (ref 9–20)
BUN/CREAT SERPL: 9 %
CALCIUM SERPL-MCNC: 8.7 MG/DL (ref 8.4–10.2)
EOSINOPHIL # BLD AUTO: 0.1 K/MM3 (ref 0–0.4)
EOSINOPHIL NFR BLD AUTO: 1.4 % (ref 0–4.3)
HCT VFR BLD CALC: 38.9 % (ref 35.5–45.6)
HEMOLYSIS INDEX: 3
HGB BLD-MCNC: 12.9 GM/DL (ref 11.8–15.2)
INR PPP: 0.97 (ref 0.87–1.13)
LYMPHOCYTES # BLD AUTO: 0.8 K/MM3 (ref 1.2–5.4)
LYMPHOCYTES NFR BLD AUTO: 8.1 % (ref 13.4–35)
MCHC RBC AUTO-ENTMCNC: 33 % (ref 32–34)
MCV RBC AUTO: 88 FL (ref 84–94)
MONOCYTES # (AUTO): 0.9 K/MM3 (ref 0–0.8)
MONOCYTES # FLD: 7 %
MONOCYTES % (AUTO): 9.9 % (ref 0–7.3)
PLATELET # BLD: 214 K/MM3 (ref 140–440)
RBC # BLD AUTO: 4.43 M/MM3 (ref 3.65–5.03)
TOTAL CELLS COUNTED: 100 /MM3
URATE SERPL-MCNC: 10.4 MG/DL (ref 3.5–7.6)

## 2021-02-10 PROCEDURE — 96375 TX/PRO/DX INJ NEW DRUG ADDON: CPT

## 2021-02-10 PROCEDURE — 84550 ASSAY OF BLOOD/URIC ACID: CPT

## 2021-02-10 PROCEDURE — 73080 X-RAY EXAM OF ELBOW: CPT

## 2021-02-10 PROCEDURE — 82947 ASSAY GLUCOSE BLOOD QUANT: CPT

## 2021-02-10 PROCEDURE — 80076 HEPATIC FUNCTION PANEL: CPT

## 2021-02-10 PROCEDURE — 85048 AUTOMATED LEUKOCYTE COUNT: CPT

## 2021-02-10 PROCEDURE — 82550 ASSAY OF CK (CPK): CPT

## 2021-02-10 PROCEDURE — 71045 X-RAY EXAM CHEST 1 VIEW: CPT

## 2021-02-10 PROCEDURE — 83735 ASSAY OF MAGNESIUM: CPT

## 2021-02-10 PROCEDURE — 96366 THER/PROPH/DIAG IV INF ADDON: CPT

## 2021-02-10 PROCEDURE — 83880 ASSAY OF NATRIURETIC PEPTIDE: CPT

## 2021-02-10 PROCEDURE — 82553 CREATINE MB FRACTION: CPT

## 2021-02-10 PROCEDURE — 96376 TX/PRO/DX INJ SAME DRUG ADON: CPT

## 2021-02-10 PROCEDURE — 89051 BODY FLUID CELL COUNT: CPT

## 2021-02-10 PROCEDURE — G0378 HOSPITAL OBSERVATION PER HR: HCPCS

## 2021-02-10 PROCEDURE — 82140 ASSAY OF AMMONIA: CPT

## 2021-02-10 PROCEDURE — 85730 THROMBOPLASTIN TIME PARTIAL: CPT

## 2021-02-10 PROCEDURE — 96367 TX/PROPH/DG ADDL SEQ IV INF: CPT

## 2021-02-10 PROCEDURE — 96365 THER/PROPH/DIAG IV INF INIT: CPT

## 2021-02-10 PROCEDURE — 85025 COMPLETE CBC W/AUTO DIFF WBC: CPT

## 2021-02-10 PROCEDURE — 85610 PROTHROMBIN TIME: CPT

## 2021-02-10 PROCEDURE — 80048 BASIC METABOLIC PNL TOTAL CA: CPT

## 2021-02-10 PROCEDURE — 93005 ELECTROCARDIOGRAM TRACING: CPT

## 2021-02-10 PROCEDURE — 99285 EMERGENCY DEPT VISIT HI MDM: CPT

## 2021-02-10 PROCEDURE — 87116 MYCOBACTERIA CULTURE: CPT

## 2021-02-10 PROCEDURE — 36415 COLL VENOUS BLD VENIPUNCTURE: CPT

## 2021-02-10 NOTE — XRAY REPORT
RIGHT ELBOW 4 VIEW(S)



INDICATION / CLINICAL INFORMATION: elbow pain



COMPARISON: None available.

 

FINDINGS: Only lateral views were obtained. Patient would not position arm for AP or oblique views.



BONES / JOINT(S): No definite acute fracture or subluxation on this limited study. No significant art
hritis. Small joint effusion.



SOFT TISSUES: Mild posterior soft tissue swelling.



ADDITIONAL FINDINGS: None.







Signer Name: GENO Freeman MD 

Signed: 2/10/2021 6:41 AM

Workstation Name: [id number]
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
## 2021-02-10 NOTE — EVENT NOTE
ED Screening Note


Date of service: 02/10/21


Time: 06:19


ED Screening Note: 


pt is a 56 y/o aam who presents for right elbow pain and swelling, pt states he 

went to sleep and woke up in pain. pain described as 7/10 sharp to right 

posterior elbow, pain is exacerbated by movement, pain is relieved by nothing 

tried,  pain awakened pt roughly 3 hrs ago, pt denies etoh today, no hx or cva, 

pos hx of arthritis, unkown if gout.  there has been no fall injury or trauma , 

there is no numbness or paralysis, 





This initial assessment/diagnostic orders/clinical plan/treatment(s) is/are 

subject to change based on patients health status, clinical progression and re-

assessment by fellow clinical providers in the ED. Further treatment and workup 

at subsequent clinical providers discretion. Patient/guardian urged not to elope

from the ED as their condition may be serious if not clinically assessed and 

managed. 





Initial orders include: elbow xray, prednisone, ibuprofen, hydrocodone,

## 2021-02-10 NOTE — XRAY REPORT
CHEST 1 VIEW 



INDICATION: 

hypertension



COMPARISON: 

4/27/2020



FINDINGS:



SUPPORT DEVICES: Pacing device located over left hemithorax electrode tip right atrium



HEART / MEDIASTINUM: Persisting cardiac enlargement



LUNGS / PLEURA: Persistent prominent bronchovascular markings. No pneumothorax. 



ADDITIONAL FINDINGS: 



IMPRESSION:

1. No significant changes compared to previous exam



Signer Name: Yevgeniy Gibbs MD 

Signed: 2/10/2021 11:23 AM

Workstation Name: [id number]
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
## 2022-04-17 ENCOUNTER — HOSPITAL ENCOUNTER (EMERGENCY)
Dept: HOSPITAL 5 - ED | Age: 57
Discharge: HOME | End: 2022-04-17
Payer: MEDICAID

## 2022-04-17 VITALS — SYSTOLIC BLOOD PRESSURE: 102 MMHG | DIASTOLIC BLOOD PRESSURE: 59 MMHG

## 2022-04-17 DIAGNOSIS — K25.9: Primary | ICD-10-CM

## 2022-04-17 DIAGNOSIS — I10: ICD-10-CM

## 2022-04-17 DIAGNOSIS — R10.9: ICD-10-CM

## 2022-04-17 LAB
ALBUMIN SERPL-MCNC: 3.5 G/DL (ref 3.9–5)
ALT SERPL-CCNC: 20 UNITS/L (ref 7–56)
BASOPHILS # (AUTO): 0.1 K/MM3 (ref 0–0.1)
BASOPHILS NFR BLD AUTO: 0.8 % (ref 0–1.8)
BILIRUB UR QL STRIP: (no result)
BLOOD UR QL VISUAL: (no result)
BUN SERPL-MCNC: 17 MG/DL (ref 9–20)
BUN/CREAT SERPL: 11 %
CALCIUM SERPL-MCNC: 8.3 MG/DL (ref 8.4–10.2)
EOSINOPHIL # BLD AUTO: 0.2 K/MM3 (ref 0–0.4)
EOSINOPHIL NFR BLD AUTO: 2.8 % (ref 0–4.3)
HCT VFR BLD CALC: 40 % (ref 35.5–45.6)
HEMOLYSIS INDEX: 12
HGB BLD-MCNC: 12.7 GM/DL (ref 11.8–15.2)
LYMPHOCYTES # BLD AUTO: 1.3 K/MM3 (ref 1.2–5.4)
LYMPHOCYTES NFR BLD AUTO: 19.3 % (ref 13.4–35)
MCHC RBC AUTO-ENTMCNC: 32 % (ref 32–34)
MCV RBC AUTO: 84 FL (ref 84–94)
MONOCYTES # (AUTO): 0.7 K/MM3 (ref 0–0.8)
MONOCYTES % (AUTO): 10.1 % (ref 0–7.3)
MUCOUS THREADS #/AREA URNS HPF: (no result) /HPF
PH UR STRIP: 5 [PH] (ref 5–7)
PLATELET # BLD: 200 K/MM3 (ref 140–440)
RBC # BLD AUTO: 4.79 M/MM3 (ref 3.65–5.03)
RBC #/AREA URNS HPF: < 1 /HPF (ref 0–6)
UROBILINOGEN UR-MCNC: < 2 MG/DL (ref ?–2)
WBC #/AREA URNS HPF: < 1 /HPF (ref 0–6)

## 2022-04-17 PROCEDURE — 96375 TX/PRO/DX INJ NEW DRUG ADDON: CPT

## 2022-04-17 PROCEDURE — 96374 THER/PROPH/DIAG INJ IV PUSH: CPT

## 2022-04-17 PROCEDURE — 93005 ELECTROCARDIOGRAM TRACING: CPT

## 2022-04-17 PROCEDURE — 83690 ASSAY OF LIPASE: CPT

## 2022-04-17 PROCEDURE — C9113 INJ PANTOPRAZOLE SODIUM, VIA: HCPCS

## 2022-04-17 PROCEDURE — 84484 ASSAY OF TROPONIN QUANT: CPT

## 2022-04-17 PROCEDURE — 85025 COMPLETE CBC W/AUTO DIFF WBC: CPT

## 2022-04-17 PROCEDURE — 74177 CT ABD & PELVIS W/CONTRAST: CPT

## 2022-04-17 PROCEDURE — 81001 URINALYSIS AUTO W/SCOPE: CPT

## 2022-04-17 PROCEDURE — 36415 COLL VENOUS BLD VENIPUNCTURE: CPT

## 2022-04-17 PROCEDURE — 80053 COMPREHEN METABOLIC PANEL: CPT

## 2022-04-17 PROCEDURE — 96361 HYDRATE IV INFUSION ADD-ON: CPT

## 2022-04-17 PROCEDURE — 99284 EMERGENCY DEPT VISIT MOD MDM: CPT

## 2022-04-17 NOTE — CAT SCAN REPORT
CT abdomen pelvis w con 



INDICATION / CLINICAL INFORMATION:

abdominal pain  omnipaque 300 100ml.



TECHNIQUE:

Axial CT imaging of abdomen and pelvis was obtained with IV contrast. Coronal and sagittal reformatte
d imaging obtained and reviewed.  All CT scans at this location are performed using CT dose reduction
 for ALARA by means of automated exposure control. 



COMPARISON:

Prior CT abdomen/pelvis 7/19/2018



FINDINGS:

CT abdomen with IV contrast demonstrates grossly normal appearance of the liver, spleen, pancreas, ki
dneys, and abdominal aorta. Both adrenal glands are mildly thickened, but without focal mass. The vicki
earance of both adrenal glands is unchanged from 2018. Gallbladder is collapsed. No biliary dilatatio
n.



CT pelvis with contrast does not demonstrate any pelvic mass, free fluid, or focal inflammatory bryson
e. A normal appendix is present within the right lower quadrant. Prostate gland is mildly enlarged. G
I tract is unremarkable.



Visualized lung bases do not demonstrate any acute pulmonary or pleural disease. Moderate cardiomegal
y.



No significant acute osseous abnormality noted.



IMPRESSION:

1. No acute finding within the abdomen or pelvis.

2. Bilateral adrenal gland hyperplasia, unchanged in appearance since 2018.

3. Cardiomegaly.



Signer Name: Danielle Byrd MD 

Signed: 4/17/2022 9:39 AM

Workstation Name: [id number]
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
## 2022-04-18 NOTE — ELECTROCARDIOGRAPH REPORT
[facility]

                                       

Test Date:    2022               Test Time:    05:51:52

Pat Name:     ESTIVEN CAMPA               Department:   

Patient ID:   [id number]          Room:          

Gender:       M                        Technician:   DAMIAN

:          1965               Requested By: POPPY ASHTON

Order Number: [id number]              Reading MD:   Marvin Lake

                                 Measurements

Intervals                              Axis          

Rate:         78                       P:            74

WA:           256                      QRS:          -54

QRSD:         98                       T:            86

QT:           429                                    

QTc:          488                                    

                           Interpretive Statements

Sinus rhythm

Prolonged WA interval

LAE, consider biatrial enlargement

LAD, consider left anterior fascicular block

Anterior infarct, old

Nonspecific T abnormalities, lateral leads

Compared to ECG 10/25/2021 22:10:22

Sinus rhythm has replaced rapid atrial fibrillation

Electronically Signed On 2022 14:15:37 EDT by Marvin Lake

## 2022-05-22 ENCOUNTER — HOSPITAL ENCOUNTER (EMERGENCY)
Dept: HOSPITAL 5 - ED | Age: 57
LOS: 1 days | Discharge: HOME | End: 2022-05-23
Payer: MEDICAID

## 2022-05-22 DIAGNOSIS — F17.290: ICD-10-CM

## 2022-05-22 DIAGNOSIS — Z98.890: ICD-10-CM

## 2022-05-22 DIAGNOSIS — M19.90: ICD-10-CM

## 2022-05-22 DIAGNOSIS — I11.0: ICD-10-CM

## 2022-05-22 DIAGNOSIS — I50.9: ICD-10-CM

## 2022-05-22 DIAGNOSIS — K21.9: ICD-10-CM

## 2022-05-22 DIAGNOSIS — M10.9: Primary | ICD-10-CM

## 2022-05-22 DIAGNOSIS — J44.9: ICD-10-CM

## 2022-05-22 PROCEDURE — 96374 THER/PROPH/DIAG INJ IV PUSH: CPT

## 2022-05-22 PROCEDURE — 99283 EMERGENCY DEPT VISIT LOW MDM: CPT
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
## 2022-05-23 VITALS — DIASTOLIC BLOOD PRESSURE: 80 MMHG | SYSTOLIC BLOOD PRESSURE: 113 MMHG

## 2022-06-02 ENCOUNTER — HOSPITAL ENCOUNTER (INPATIENT)
Dept: HOSPITAL 5 - ED | Age: 57
LOS: 3 days | Discharge: HOME | DRG: 291 | End: 2022-06-05
Attending: INTERNAL MEDICINE | Admitting: HOSPITALIST
Payer: MEDICAID

## 2022-06-02 DIAGNOSIS — I50.23: ICD-10-CM

## 2022-06-02 DIAGNOSIS — Z71.6: ICD-10-CM

## 2022-06-02 DIAGNOSIS — Z95.810: ICD-10-CM

## 2022-06-02 DIAGNOSIS — Z91.14: ICD-10-CM

## 2022-06-02 DIAGNOSIS — I25.10: ICD-10-CM

## 2022-06-02 DIAGNOSIS — I42.0: ICD-10-CM

## 2022-06-02 DIAGNOSIS — K21.9: ICD-10-CM

## 2022-06-02 DIAGNOSIS — I48.92: ICD-10-CM

## 2022-06-02 DIAGNOSIS — J44.9: ICD-10-CM

## 2022-06-02 DIAGNOSIS — I48.0: ICD-10-CM

## 2022-06-02 DIAGNOSIS — M19.90: ICD-10-CM

## 2022-06-02 DIAGNOSIS — E83.51: ICD-10-CM

## 2022-06-02 DIAGNOSIS — F17.200: ICD-10-CM

## 2022-06-02 DIAGNOSIS — Z82.49: ICD-10-CM

## 2022-06-02 DIAGNOSIS — I13.0: Primary | ICD-10-CM

## 2022-06-02 DIAGNOSIS — N18.9: ICD-10-CM

## 2022-06-02 DIAGNOSIS — Z79.899: ICD-10-CM

## 2022-06-02 DIAGNOSIS — I25.2: ICD-10-CM

## 2022-06-02 DIAGNOSIS — E78.5: ICD-10-CM

## 2022-06-02 DIAGNOSIS — Z79.01: ICD-10-CM

## 2022-06-02 LAB
ALBUMIN SERPL-MCNC: 3.9 G/DL (ref 3.9–5)
ALT SERPL-CCNC: 13 UNITS/L (ref 7–56)
BASOPHILS # (AUTO): 0.1 K/MM3 (ref 0–0.1)
BASOPHILS NFR BLD AUTO: 1.1 % (ref 0–1.8)
BUN SERPL-MCNC: 22 MG/DL (ref 9–20)
BUN/CREAT SERPL: 15 %
CALCIUM SERPL-MCNC: 8.6 MG/DL (ref 8.4–10.2)
EOSINOPHIL # BLD AUTO: 0.3 K/MM3 (ref 0–0.4)
EOSINOPHIL NFR BLD AUTO: 5.5 % (ref 0–4.3)
HCT VFR BLD CALC: 38.2 % (ref 35.5–45.6)
HEMOLYSIS INDEX: 3
HGB BLD-MCNC: 12.5 GM/DL (ref 11.8–15.2)
LYMPHOCYTES # BLD AUTO: 1.4 K/MM3 (ref 1.2–5.4)
LYMPHOCYTES NFR BLD AUTO: 22.5 % (ref 13.4–35)
MCHC RBC AUTO-ENTMCNC: 33 % (ref 32–34)
MCV RBC AUTO: 83 FL (ref 84–94)
MONOCYTES # (AUTO): 0.8 K/MM3 (ref 0–0.8)
MONOCYTES % (AUTO): 13.6 % (ref 0–7.3)
PLATELET # BLD: 202 K/MM3 (ref 140–440)
RBC # BLD AUTO: 4.6 M/MM3 (ref 3.65–5.03)

## 2022-06-02 PROCEDURE — 94760 N-INVAS EAR/PLS OXIMETRY 1: CPT

## 2022-06-02 PROCEDURE — 83735 ASSAY OF MAGNESIUM: CPT

## 2022-06-02 PROCEDURE — 80048 BASIC METABOLIC PNL TOTAL CA: CPT

## 2022-06-02 PROCEDURE — 82962 GLUCOSE BLOOD TEST: CPT

## 2022-06-02 PROCEDURE — 71045 X-RAY EXAM CHEST 1 VIEW: CPT

## 2022-06-02 PROCEDURE — 94640 AIRWAY INHALATION TREATMENT: CPT

## 2022-06-02 PROCEDURE — 85025 COMPLETE CBC W/AUTO DIFF WBC: CPT

## 2022-06-02 PROCEDURE — 84484 ASSAY OF TROPONIN QUANT: CPT

## 2022-06-02 PROCEDURE — 93005 ELECTROCARDIOGRAM TRACING: CPT

## 2022-06-02 PROCEDURE — 80053 COMPREHEN METABOLIC PANEL: CPT

## 2022-06-02 PROCEDURE — 36415 COLL VENOUS BLD VENIPUNCTURE: CPT

## 2022-06-02 PROCEDURE — 83880 ASSAY OF NATRIURETIC PEPTIDE: CPT
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
## 2022-06-02 NOTE — XRAY REPORT
Chest single view



INDICATION: Dyspnea



IMPRESSION: Cardiomegaly with moderate bilateral cardiopulmonary edema. Small pleural effusions prese
nt.



Signer Name: Alexey Yates MD 

Signed: 6/2/2022 8:23 PM

Workstation Name: [id number]

## 2022-06-03 LAB
BUN SERPL-MCNC: 20 MG/DL (ref 9–20)
BUN/CREAT SERPL: 13 %
CALCIUM SERPL-MCNC: 8.3 MG/DL (ref 8.4–10.2)
HEMOLYSIS INDEX: 15

## 2022-06-03 RX ADMIN — FUROSEMIDE SCH: 10 INJECTION, SOLUTION INTRAVENOUS at 18:46

## 2022-06-03 RX ADMIN — ISOSORBIDE DINITRATE AND HYDRALAZINE HYDROCHLORIDE SCH: 37.5; 2 TABLET ORAL at 23:00

## 2022-06-03 RX ADMIN — FUROSEMIDE SCH MG: 10 INJECTION, SOLUTION INTRAVENOUS at 10:18

## 2022-06-03 RX ADMIN — CALCIUM SCH: 500 TABLET ORAL at 18:45

## 2022-06-03 RX ADMIN — SUCRALFATE SCH: 1 TABLET ORAL at 18:45

## 2022-06-03 RX ADMIN — Medication SCH ML: at 10:19

## 2022-06-03 RX ADMIN — IPRATROPIUM BROMIDE AND ALBUTEROL SULFATE SCH AMPUL: .5; 3 SOLUTION RESPIRATORY (INHALATION) at 07:50

## 2022-06-03 RX ADMIN — IPRATROPIUM BROMIDE AND ALBUTEROL SULFATE SCH AMPUL: .5; 3 SOLUTION RESPIRATORY (INHALATION) at 13:48

## 2022-06-03 RX ADMIN — AMIODARONE HYDROCHLORIDE SCH MG: 200 TABLET ORAL at 22:58

## 2022-06-03 RX ADMIN — PANTOPRAZOLE SODIUM SCH MG: 40 TABLET, DELAYED RELEASE ORAL at 10:18

## 2022-06-03 RX ADMIN — VALSARTAN SCH MG: 40 TABLET, FILM COATED ORAL at 10:18

## 2022-06-03 RX ADMIN — AMIODARONE HYDROCHLORIDE SCH MG: 200 TABLET ORAL at 10:18

## 2022-06-03 RX ADMIN — SUCRALFATE SCH GM: 1 TABLET ORAL at 18:51

## 2022-06-03 RX ADMIN — ISOSORBIDE DINITRATE AND HYDRALAZINE HYDROCHLORIDE SCH EACH: 37.5; 2 TABLET ORAL at 05:45

## 2022-06-03 RX ADMIN — IPRATROPIUM BROMIDE AND ALBUTEROL SULFATE SCH AMPUL: .5; 3 SOLUTION RESPIRATORY (INHALATION) at 20:41

## 2022-06-03 RX ADMIN — ISOSORBIDE DINITRATE AND HYDRALAZINE HYDROCHLORIDE SCH: 37.5; 2 TABLET ORAL at 18:45

## 2022-06-03 RX ADMIN — SUCRALFATE SCH GM: 1 TABLET ORAL at 10:18

## 2022-06-03 RX ADMIN — APIXABAN SCH MG: 5 TABLET, FILM COATED ORAL at 10:18

## 2022-06-03 RX ADMIN — SPIRONOLACTONE SCH MG: 50 TABLET ORAL at 10:18

## 2022-06-03 RX ADMIN — APIXABAN SCH MG: 5 TABLET, FILM COATED ORAL at 22:07

## 2022-06-03 RX ADMIN — SUCRALFATE SCH GM: 1 TABLET ORAL at 22:07

## 2022-06-03 NOTE — ELECTROCARDIOGRAPH REPORT
[facility]

                                       

Test Date:    2022               Test Time:    07:40:48

Pat Name:     ESTIVEN CAMPA               Department:   

Patient ID:   [id number]          Room:         A456

Gender:       M                        Technician:   GABRIEL

:          1965               Requested By: ED DOC

Order Number: [id number]              Reading MD:   Wes Ordonez

                                 Measurements

Intervals                              Axis          

Rate:         80                       P:            64

PA:           263                      QRS:          -68

QRSD:         90                       T:            85

QT:           419                                    

QTc:          483                                    

                           Interpretive Statements

Sinus rhythm

Prolonged PA interval

Inferior infarct, old

Anterior infarct, old

Compared to ECG 2022 19:56:13

No significant changes

Electronically Signed On 6-3-2022 8:57:08 EDT by Wes Ordonez

## 2022-06-03 NOTE — EVENT NOTE
Date: 06/03/22








#Acute on chronic systolic heart failure


- Continue CHF exacerbation protocol: Telemetry, Strict I/O, monitor urine outp

ut every shift, daily weights, afterload reduction, low-sodium diet, and fluid 

restriction of approximately 1.5 mL/day, IV Lasix 40 mg twice daily, 

spironolactone 50 mg daily, metolazone


- Initiating milrinone infusion


- Supplemental oxygen: Room air


- ProBNP on admission: 3785


- Cardiology consulted; appreciate recs


-Previous TTE revealing EF 15-20%.  Canceled repeat TTE given recent's 

evaluation and discharge.


- Continue to monitor





#Hyperlipidemia


#Hypertension


- home medications: Amiodarone 200 mg daily, BiDil 20/37.5 mg every 8 hours, 

Aldactone 50 mg daily, Lipitor 40 mg daily


- current medications: Coreg 25 mg twice daily, Aldactone 50 mg daily, valsartan

40 mg daily


- SBP goal <160 and DBP goal <90 while inpatient


- continue to monitor





#AKIruled out


 Creatinine 1.6 (baseline 1.4-1.6)


 Continue to monitor with daily BMP





#Hypocalcemia


 Calcium 8.3


 Repleted.  Continue to monitor.





#Tobacco dependence


#Tobacco/Smoking cessation counseling


- Counseled patient about the importance of smoking cessation and the possible 

sequelae as a result of continued tobacco consumption. The patient expresses 

understanding. 


-Time: +15 mins





#Coordination of CARE time: 30 minutes.  Total visit time equals 30 or more 

minutes with greater than 50% spent face-to-face on coordination of care and 

counseling.





#Advanced care planning


-Disease education conducted, care plan discussed, diagnoses discussed, 

prognosis discussed, and patient acknowledges understanding with care plan


-Time: +30 min

## 2022-06-03 NOTE — ELECTROCARDIOGRAPH REPORT
[facility]

                                       

Test Date:    2022               Test Time:    19:56:13

Pat Name:     ESTIVEN CAMPA               Department:   

Patient ID:   [id number]          Room:         A456

Gender:       M                        Technician:   JULIANO

:          1965               Requested By: ED DOC

Order Number: [id number]              Reading MD:   Wes Ordonez

                                 Measurements

Intervals                              Axis          

Rate:         82                       P:            47

TX:           270                      QRS:          -53

QRSD:         88                       T:            87

QT:           421                                    

QTc:          492                                    

                           Interpretive Statements

Sinus rhythm

Prolonged TX interval

Probable left atrial enlargement

Inferior infarct, old

Probable anteroseptal infarct, old

Compared to ECG 2022 13:28:05

First degree AV block now present

Atrial fibrillation no longer present

Ventricular premature complex(es) no longer present

Myocardial infarct finding still present

Electronically Signed On 6-3-2022 8:56:26 EDT by Wes Ordonez
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
## 2022-06-04 LAB
BASOPHILS # (AUTO): 0 K/MM3 (ref 0–0.1)
BASOPHILS NFR BLD AUTO: 0.7 % (ref 0–1.8)
BUN SERPL-MCNC: 21 MG/DL (ref 9–20)
BUN/CREAT SERPL: 14 %
CALCIUM SERPL-MCNC: 8.2 MG/DL (ref 8.4–10.2)
EOSINOPHIL # BLD AUTO: 0.3 K/MM3 (ref 0–0.4)
EOSINOPHIL NFR BLD AUTO: 4.4 % (ref 0–4.3)
HCT VFR BLD CALC: 40 % (ref 35.5–45.6)
HEMOLYSIS INDEX: 13
HGB BLD-MCNC: 12.2 GM/DL (ref 11.8–15.2)
LYMPHOCYTES # BLD AUTO: 1.4 K/MM3 (ref 1.2–5.4)
LYMPHOCYTES NFR BLD AUTO: 24.3 % (ref 13.4–35)
MCHC RBC AUTO-ENTMCNC: 31 % (ref 32–34)
MCV RBC AUTO: 83 FL (ref 84–94)
MONOCYTES # (AUTO): 0.6 K/MM3 (ref 0–0.8)
MONOCYTES % (AUTO): 9.5 % (ref 0–7.3)
PLATELET # BLD: 210 K/MM3 (ref 140–440)
RBC # BLD AUTO: 4.8 M/MM3 (ref 3.65–5.03)

## 2022-06-04 RX ADMIN — MORPHINE SULFATE PRN MG: 2 INJECTION, SOLUTION INTRAMUSCULAR; INTRAVENOUS at 10:24

## 2022-06-04 RX ADMIN — IPRATROPIUM BROMIDE AND ALBUTEROL SULFATE SCH AMPUL: .5; 3 SOLUTION RESPIRATORY (INHALATION) at 08:23

## 2022-06-04 RX ADMIN — ISOSORBIDE DINITRATE AND HYDRALAZINE HYDROCHLORIDE SCH EACH: 37.5; 2 TABLET ORAL at 06:19

## 2022-06-04 RX ADMIN — FUROSEMIDE SCH MG: 10 INJECTION, SOLUTION INTRAVENOUS at 18:15

## 2022-06-04 RX ADMIN — SUCRALFATE SCH GM: 1 TABLET ORAL at 13:36

## 2022-06-04 RX ADMIN — IPRATROPIUM BROMIDE AND ALBUTEROL SULFATE SCH: .5; 3 SOLUTION RESPIRATORY (INHALATION) at 14:05

## 2022-06-04 RX ADMIN — SUCRALFATE SCH GM: 1 TABLET ORAL at 21:54

## 2022-06-04 RX ADMIN — Medication SCH ML: at 23:11

## 2022-06-04 RX ADMIN — APIXABAN SCH MG: 5 TABLET, FILM COATED ORAL at 21:55

## 2022-06-04 RX ADMIN — PANTOPRAZOLE SODIUM SCH MG: 40 TABLET, DELAYED RELEASE ORAL at 10:19

## 2022-06-04 RX ADMIN — MORPHINE SULFATE PRN MG: 2 INJECTION, SOLUTION INTRAMUSCULAR; INTRAVENOUS at 22:51

## 2022-06-04 RX ADMIN — SUCRALFATE SCH GM: 1 TABLET ORAL at 10:18

## 2022-06-04 RX ADMIN — CALCIUM SCH MG: 500 TABLET ORAL at 10:18

## 2022-06-04 RX ADMIN — Medication SCH: at 07:18

## 2022-06-04 RX ADMIN — FUROSEMIDE SCH MG: 10 INJECTION, SOLUTION INTRAVENOUS at 06:19

## 2022-06-04 RX ADMIN — APIXABAN SCH MG: 5 TABLET, FILM COATED ORAL at 10:18

## 2022-06-04 RX ADMIN — IPRATROPIUM BROMIDE AND ALBUTEROL SULFATE SCH AMPUL: .5; 3 SOLUTION RESPIRATORY (INHALATION) at 22:08

## 2022-06-04 RX ADMIN — VALSARTAN SCH MG: 40 TABLET, FILM COATED ORAL at 10:18

## 2022-06-04 RX ADMIN — MILRINONE LACTATE IN DEXTROSE SCH MLS/HR: 200 INJECTION, SOLUTION INTRAVENOUS at 10:16

## 2022-06-04 RX ADMIN — SPIRONOLACTONE SCH MG: 50 TABLET ORAL at 10:18

## 2022-06-04 RX ADMIN — MILRINONE LACTATE IN DEXTROSE SCH MLS/HR: 200 INJECTION, SOLUTION INTRAVENOUS at 22:45

## 2022-06-04 RX ADMIN — AMIODARONE HYDROCHLORIDE SCH MG: 200 TABLET ORAL at 10:18

## 2022-06-04 RX ADMIN — AMIODARONE HYDROCHLORIDE SCH MG: 200 TABLET ORAL at 21:54

## 2022-06-04 RX ADMIN — Medication SCH ML: at 10:19

## 2022-06-04 RX ADMIN — ISOSORBIDE DINITRATE AND HYDRALAZINE HYDROCHLORIDE SCH: 37.5; 2 TABLET ORAL at 23:50

## 2022-06-04 RX ADMIN — SUCRALFATE SCH GM: 1 TABLET ORAL at 18:15

## 2022-06-04 RX ADMIN — ISOSORBIDE DINITRATE AND HYDRALAZINE HYDROCHLORIDE SCH EACH: 37.5; 2 TABLET ORAL at 13:36

## 2022-06-04 NOTE — PROGRESS NOTE
Assessment and Plan





- Patient Problems


(1) Acute on chronic systolic heart failure


Current Visit: Yes   Status: Acute   





(2) Acute exacerbation of CHF (congestive heart failure)


Current Visit: No   Status: Acute   


Qualifiers: 


   Heart failure type: combined systolic and diastolic   Qualified Code(s): 

I50.43 - Acute on chronic combined systolic (congestive) and diastolic 

(congestive) heart failure   





(3) Acute on chronic renal failure


Current Visit: No   Status: Acute   





Subjective


Date of service: 06/04/22


Interval history: 





FEELS ALITTLE BETTER





Objective


                                   Vital Signs











  Temp Pulse Pulse Resp Resp BP BP


 


 06/04/22 08:39    92 H   20  


 


 06/04/22 08:23       


 


 06/04/22 07:50  98.2 F  87   18   101/70 


 


 06/04/22 04:25  97.5 F L  83   18   


 


 06/04/22 04:00     19    135/67


 


 06/04/22 01:09       


 


 06/04/22 00:00   71     


 


 06/03/22 22:32   91 H     


 


 06/03/22 22:08   72   17    100/71


 


 06/03/22 20:39       


 


 06/03/22 20:38    70   18  


 


 06/03/22 20:15  98.0 F  67   18   121/77 


 


 06/03/22 15:58   76     87/42 


 


 06/03/22 15:00       


 


 06/03/22 13:48    66   20  


 


 06/03/22 11:49  98.2 F  58 L   18    92/75














  Pulse Ox


 


 06/04/22 08:39 


 


 06/04/22 08:23  98


 


 06/04/22 07:50  95


 


 06/04/22 04:25  96


 


 06/04/22 04:00  97


 


 06/04/22 01:09  95


 


 06/04/22 00:00 


 


 06/03/22 22:32 


 


 06/03/22 22:08  93


 


 06/03/22 20:39  95


 


 06/03/22 20:38 


 


 06/03/22 20:15  92


 


 06/03/22 15:58  80 L


 


 06/03/22 15:00  95


 


 06/03/22 13:48 


 


 06/03/22 11:49  92














- Physical Examination


HEENT: Positive: PERRL


Neck: Positive: neck supple, JVD/HJR


Cardiac: Positive: Reg Rate and Rhythm


Lungs: Positive: Rales


Neuro: Positive: Grossly Intact


Abdomen: Positive: Soft


Skin: Positive: Clear


Extremities: Present: +1 Edema





- Labs and Meds


                                       CBC











  06/04/22 Range/Units





  07:17 


 


WBC  5.8  (4.5-11.0)  K/mm3


 


RBC  4.80  (3.65-5.03)  M/mm3


 


Hgb  12.2  (11.8-15.2)  gm/dl


 


Hct  40.0  (35.5-45.6)  %


 


Plt Count  210  (140-440)  K/mm3


 


Lymph # (Auto)  1.4  (1.2-5.4)  K/mm3


 


Mono # (Auto)  0.6  (0.0-0.8)  K/mm3


 


Eos # (Auto)  0.3  (0.0-0.4)  K/mm3


 


Baso # (Auto)  0.0  (0.0-0.1)  K/mm3








                          Comprehensive Metabolic Panel











  06/04/22 Range/Units





  07:17 


 


Sodium  140  (137-145)  mmol/L


 


Potassium  4.2  (3.6-5.0)  mmol/L


 


Chloride  103.6  ()  mmol/L


 


Carbon Dioxide  28  (22-30)  mmol/L


 


BUN  21 H  (9-20)  mg/dL


 


Creatinine  1.5 H  (0.8-1.3)  mg/dL


 


Glucose  103 H  ()  mg/dL


 


Calcium  8.2 L  (8.4-10.2)  mg/dL

## 2022-06-04 NOTE — PROGRESS NOTE
Assessment and Plan


Assessment and plan: 








#Acute on chronic systolic heart failure


- Continue CHF exacerbation protocol: Telemetry, Strict I/O, monitor urine 

output every shift, daily weights, afterload reduction, low-sodium diet, and 

fluid restriction of approximately 1.5 mL/day, IV Lasix 40 mg twice daily, 

spironolactone 50 mg daily, metolazone


- Continue milrinone infusion. 


- Supplemental oxygen: Room air


- ProBNP on admission: 3785


- Cardiology consulted; appreciate recs


-Previous TTE revealing EF 15-20%.  Canceled repeat TTE given recent's 

evaluation and discharge.


- Continue to monitor





#Hyperlipidemia


#Hypertension


- home medications: Amiodarone 200 mg daily, BiDil 20/37.5 mg every 8 hours, 

Aldactone 50 mg daily, Lipitor 40 mg daily


- current medications: Coreg 25 mg twice daily, Aldactone 50 mg daily, valsartan

40 mg daily


- SBP goal <160 and DBP goal <90 while inpatient


- continue to monitor





#AKIruled out


 Creatinine 1.6 (baseline 1.4-1.6)


 Continue to monitor with daily BMP





#Hypocalcemia


 Calcium 8.3


 Repleted.  Continue to monitor.





#Tobacco dependence


#Tobacco/Smoking cessation counseling


- Counseled patient about the importance of smoking cessation and the possible 

sequelae as a result of continued tobacco consumption. The patient expresses 

understanding. 


-Time: +15 mins





#Advanced care planning


-Disease education conducted, care plan discussed, diagnoses discussed, 

prognosis discussed, and patient acknowledges understanding with care plan


-Time: +30 min





Disposition Plan: Continue medical management


Total Time Spent with Patient (Minutes): 45 minutes





History


Interval history: 





No acute events overnight.





Hospitalist Physical





- Constitutional


Vitals: 


                                        











Temp Pulse Resp BP Pulse Ox


 


 98.1 F   83   18   101/76   94 


 


 06/04/22 11:06  06/04/22 11:07  06/04/22 11:06  06/04/22 11:06  06/04/22 11:07











General appearance: Present: no acute distress, well-nourished





- EENT


Eyes: Present: PERRL, EOM intact, discharge


ENT: hearing intact, clear oral mucosa





- Neck


Neck: Present: supple, normal ROM





- Respiratory


Respiratory effort: normal


Respiratory: bilateral: diminished





- Cardiovascular


Rhythm: regular


Heart Sounds: Present: S1 & S2





- Extremities


Extremities: no ischemia, pulses intact, pulses symmetrical, normal temperature,

normal color, Full ROM


Peripheral Pulses: within normal limits





- Abdominal


General gastrointestinal: soft, non-tender, non-distended, normal bowel sounds





- Integumentary


Integumentary: Present: clear, warm, dry





- Psychiatric


Psychiatric: appropriate mood/affect, memory intact, cooperative





- Neurologic


Neurologic: CNII-XII intact, moves all extremities





- Allied Health


Allied health notes reviewed: nursing





HEART Score





- HEART Score


EKG: Non-specific


Age: 45-65


Risk factors: 1-2 risk factors


Troponin: 


                                        











Troponin T  < 0.010 ng/mL (0.00-0.029)   06/02/22  19:14    











Troponin: < normal limit





Results





- Labs


CBC & Chem 7: 


                                 06/04/22 07:17





                                 06/04/22 07:17


Labs: 


                             Laboratory Last Values











WBC  5.8 K/mm3 (4.5-11.0)   06/04/22  07:17    


 


RBC  4.80 M/mm3 (3.65-5.03)   06/04/22  07:17    


 


Hgb  12.2 gm/dl (11.8-15.2)   06/04/22  07:17    


 


Hct  40.0 % (35.5-45.6)   06/04/22  07:17    


 


MCV  83 fl (84-94)  L  06/04/22  07:17    


 


MCH  25 pg (28-32)  L  06/04/22  07:17    


 


MCHC  31 % (32-34)  L  06/04/22  07:17    


 


RDW  20.7 % (13.2-15.2)  H  06/04/22  07:17    


 


Plt Count  210 K/mm3 (140-440)   06/04/22  07:17    


 


Lymph % (Auto)  24.3 % (13.4-35.0)   06/04/22  07:17    


 


Mono % (Auto)  9.5 % (0.0-7.3)  H  06/04/22  07:17    


 


Eos % (Auto)  4.4 % (0.0-4.3)  H  06/04/22  07:17    


 


Baso % (Auto)  0.7 % (0.0-1.8)   06/04/22  07:17    


 


Lymph # (Auto)  1.4 K/mm3 (1.2-5.4)   06/04/22  07:17    


 


Mono # (Auto)  0.6 K/mm3 (0.0-0.8)   06/04/22  07:17    


 


Eos # (Auto)  0.3 K/mm3 (0.0-0.4)   06/04/22  07:17    


 


Baso # (Auto)  0.0 K/mm3 (0.0-0.1)   06/04/22  07:17    


 


Seg Neutrophils %  61.1 % (40.0-70.0)   06/04/22  07:17    


 


Seg Neutrophils #  3.6 K/mm3 (1.8-7.7)   06/04/22  07:17    


 


Sodium  140 mmol/L (137-145)   06/04/22  07:17    


 


Potassium  4.2 mmol/L (3.6-5.0)   06/04/22  07:17    


 


Chloride  103.6 mmol/L ()   06/04/22  07:17    


 


Carbon Dioxide  28 mmol/L (22-30)   06/04/22  07:17    


 


Anion Gap  13 mmol/L  06/04/22  07:17    


 


BUN  21 mg/dL (9-20)  H  06/04/22  07:17    


 


Creatinine  1.5 mg/dL (0.8-1.3)  H  06/04/22  07:17    


 


Estimated GFR  58 ml/min  06/04/22  07:17    


 


BUN/Creatinine Ratio  14 %  06/04/22  07:17    


 


Glucose  103 mg/dL ()  H  06/04/22  07:17    


 


POC Glucose  159 mg/dL ()  H  06/03/22  15:54    


 


Calcium  8.2 mg/dL (8.4-10.2)  L  06/04/22  07:17    


 


Magnesium  2.20 mg/dL (1.7-2.3)   06/03/22  05:00    


 


Total Bilirubin  1.10 mg/dL (0.1-1.2)   06/02/22  19:14    


 


AST  14 units/L (5-40)   06/02/22  19:14    


 


ALT  13 units/L (7-56)   06/02/22  19:14    


 


Alkaline Phosphatase  88 units/L ()   06/02/22  19:14    


 


Troponin T  < 0.010 ng/mL (0.00-0.029)   06/02/22  19:14    


 


NT-Pro-B Natriuret Pep  3785 pg/mL (0-900)  H  06/02/22  19:14    


 


Total Protein  6.7 g/dL (6.3-8.2)   06/02/22  19:14    


 


Albumin  3.9 g/dL (3.9-5)   06/02/22  19:14    


 


Albumin/Globulin Ratio  1.4 %  06/02/22  19:14    











Turner/IV: 


                                        





Voiding Method                   Urinal











Active Medications





- Current Medications


Current Medications: 














Generic Name Dose Route Start Last Admin





  Trade Name Freq  PRN Reason Stop Dose Admin


 


Acetaminophen  650 mg  06/03/22 00:01 





  Acetaminophen 325 Mg Tab  PO  





  Q4H PRN  





  Pain MILD(1-3)/Fever >100.5/HA  


 


Albuterol  2.5 mg  06/03/22 00:01 





  Albuterol 2.5 Mg/3 Ml Nebu  IH  





  Q3HRT PRN  





  Shortness Of Breath  


 


Albuterol/Ipratropium  1 ampul  06/03/22 08:00  06/04/22 08:23





  Ipratropium/Albuterol Sulfate 3 Ml Ampul.Neb  IH   1 ampul





  TIDRT MEREDITH   Administration


 


Amiodarone HCl  200 mg  06/03/22 10:00  06/04/22 10:18





  Amiodarone 200 Mg Tab  PO   200 mg





  BID MEREDITH   Administration


 


Apixaban  5 mg  06/03/22 10:00  06/04/22 10:18





  Apixaban 5 Mg Tab  PO   5 mg





  Q12HR MEREDITH   Administration


 


Calcium Carbonate/Glycine  1,250 mg  06/03/22 14:00  06/04/22 10:18





  Calcium Carbonate 1250 Mg Tab  PO   1,250 mg





  QDAY MEREDITH   Administration


 


Carvedilol  25 mg  06/03/22 10:00  06/04/22 10:19





  Carvedilol 25 Mg Tab  PO   Not Given





  BID MEREDITH  


 


Furosemide  40 mg  06/03/22 08:00  06/04/22 06:19





  Furosemide 40 Mg/4 Ml Inj  IV   40 mg





  0600,1800 MEREDITH   Administration


 


Milrinone Lactate/Dextrose  20 mg in 100 mls @ 7.14 mls/hr  06/03/22 11:00  

06/04/22 10:16





  Milrinone-D5w 20 Mg/100 Ml  IV  06/06/22 10:59  0.25 mcg/kg/min





  AS DIRECT MEREDITH   7.14 mls/hr





    Administration





  Protocol  





  0.25 MCG/KG/MIN  


 


Isosorbide Dinitrate/Hydralazine  1 each  06/03/22 06:00  06/04/22 13:36





  Isosorb Dinit/Hydralazine 20-37.5mg Tab  PO   1 each





  Q8HR MEREDITH   Administration


 


Metolazone  5 mg  06/03/22 11:00  06/04/22 10:18





  Metolazone 5 Mg Tab  PO   5 mg





  QDAY MEREDITH   Administration


 


Morphine Sulfate  2 mg  06/03/22 00:01  06/04/22 10:24





  Morphine 2 Mg/1 Ml Inj  IV   2 mg





  Q4H PRN   Administration





  Pain, Moderate (4-6)  


 


Morphine Sulfate  4 mg  06/03/22 00:01  06/03/22 05:45





  Morphine 4 Mg/1 Ml Inj  IV   4 mg





  Q4H PRN   Administration





  Pain , Severe (7-10)  


 


Ondansetron HCl  4 mg  06/03/22 00:01 





  Ondansetron 4 Mg/2 Ml Inj  IV  





  Q8H PRN  





  Nausea And Vomiting  


 


Pantoprazole Sodium  40 mg  06/03/22 10:00  06/04/22 10:19





  Pantoprazole 40 Mg Tab  PO   40 mg





  DAILY MEREDITH   Administration


 


Sodium Chloride  10 ml  06/03/22 10:00  06/04/22 10:19





  Sodium Chloride 0.9% 10 Ml Flush Syringe  IV   10 ml





  BID MEREDITH   Administration


 


Sodium Chloride  10 ml  06/03/22 00:01 





  Sodium Chloride 0.9% 10 Ml Flush Syringe  IV  





  PRN PRN  





  LINE FLUSH  


 


Spironolactone  50 mg  06/03/22 10:00  06/04/22 10:18





  Spironolactone 50 Mg Tab  PO   50 mg





  QDAY MEREDITH   Administration


 


Sucralfate  1 gm  06/03/22 07:30  06/04/22 13:36





  Sucralfate 1 Gm Tab  PO   1 gm





  ACHS MEREDITH   Administration


 


Valsartan  40 mg  06/03/22 10:00  06/04/22 10:18





  Valsartan 40 Mg Tab  PO   40 mg





  DAILY MEREDITH   Administration

## 2022-06-05 VITALS — DIASTOLIC BLOOD PRESSURE: 62 MMHG | SYSTOLIC BLOOD PRESSURE: 95 MMHG

## 2022-06-05 LAB
BASOPHILS # (AUTO): 0.1 K/MM3 (ref 0–0.1)
BASOPHILS NFR BLD AUTO: 0.7 % (ref 0–1.8)
BUN SERPL-MCNC: 24 MG/DL (ref 9–20)
BUN/CREAT SERPL: 16 %
CALCIUM SERPL-MCNC: 8.8 MG/DL (ref 8.4–10.2)
EOSINOPHIL # BLD AUTO: 0.3 K/MM3 (ref 0–0.4)
EOSINOPHIL NFR BLD AUTO: 3.8 % (ref 0–4.3)
HCT VFR BLD CALC: 39.8 % (ref 35.5–45.6)
HEMOLYSIS INDEX: 43
HGB BLD-MCNC: 12.6 GM/DL (ref 11.8–15.2)
LYMPHOCYTES # BLD AUTO: 1.5 K/MM3 (ref 1.2–5.4)
LYMPHOCYTES NFR BLD AUTO: 21.4 % (ref 13.4–35)
MCHC RBC AUTO-ENTMCNC: 32 % (ref 32–34)
MCV RBC AUTO: 82 FL (ref 84–94)
MONOCYTES # (AUTO): 1 K/MM3 (ref 0–0.8)
MONOCYTES % (AUTO): 13.2 % (ref 0–7.3)
PLATELET # BLD: 219 K/MM3 (ref 140–440)
RBC # BLD AUTO: 4.87 M/MM3 (ref 3.65–5.03)

## 2022-06-05 RX ADMIN — ISOSORBIDE DINITRATE AND HYDRALAZINE HYDROCHLORIDE SCH EACH: 37.5; 2 TABLET ORAL at 05:50

## 2022-06-05 RX ADMIN — IPRATROPIUM BROMIDE AND ALBUTEROL SULFATE SCH: .5; 3 SOLUTION RESPIRATORY (INHALATION) at 09:18

## 2022-06-05 RX ADMIN — FUROSEMIDE SCH MG: 10 INJECTION, SOLUTION INTRAVENOUS at 05:49

## 2022-06-05 RX ADMIN — SUCRALFATE SCH GM: 1 TABLET ORAL at 08:58

## 2022-06-05 NOTE — DISCHARGE SUMMARY
Providers





- Providers


Date of Admission: 


06/03/22 01:10





Attending physician: 


GRIFFIN MORALES





                                        





06/03/22 00:01


Consult to Physician [CONS] Routine 


   Comment: 


   Consulting Provider: ELSA CARMICHAEL


   Physician Instructions: 


   Reason For Exam: chf











Primary care physician: 


EFREN ROMEO








Hospitalization


Condition: Fair


Disposition: 30 STILL A PATIENT





Exam





- Constitutional


Vitals: 


                                        











Temp Pulse Resp BP Pulse Ox


 


 98.5 F   90   20   118/85   93 


 


 06/05/22 04:10  06/05/22 04:10  06/05/22 04:10  06/05/22 04:10  06/05/22 04:10














Plan


Follow up with: 


EFREN ROMEO MD [Primary Care Provider] - 3-5 Days

## 2022-07-18 ENCOUNTER — HOSPITAL ENCOUNTER (EMERGENCY)
Dept: HOSPITAL 5 - ED | Age: 57
Discharge: HOME | End: 2022-07-18
Payer: MEDICAID

## 2022-07-18 VITALS — DIASTOLIC BLOOD PRESSURE: 108 MMHG | SYSTOLIC BLOOD PRESSURE: 157 MMHG

## 2022-07-18 DIAGNOSIS — I50.9: ICD-10-CM

## 2022-07-18 DIAGNOSIS — Z98.890: ICD-10-CM

## 2022-07-18 DIAGNOSIS — M19.90: ICD-10-CM

## 2022-07-18 DIAGNOSIS — F17.200: ICD-10-CM

## 2022-07-18 DIAGNOSIS — I11.0: Primary | ICD-10-CM

## 2022-07-18 DIAGNOSIS — Z79.899: ICD-10-CM

## 2022-07-18 DIAGNOSIS — K21.9: ICD-10-CM

## 2022-07-18 DIAGNOSIS — I21.9: ICD-10-CM

## 2022-07-18 DIAGNOSIS — J44.1: ICD-10-CM

## 2022-07-18 LAB
ALBUMIN SERPL-MCNC: 3.6 G/DL (ref 3.9–5)
ALT SERPL-CCNC: 14 UNITS/L (ref 7–56)
APTT BLD: 29.2 SEC. (ref 24.2–36.6)
BASOPHILS # (AUTO): 0 K/MM3 (ref 0–0.1)
BASOPHILS NFR BLD AUTO: 0.8 % (ref 0–1.8)
BUN SERPL-MCNC: 14 MG/DL (ref 9–20)
BUN/CREAT SERPL: 9 %
CALCIUM SERPL-MCNC: 8.7 MG/DL (ref 8.4–10.2)
EOSINOPHIL # BLD AUTO: 0.2 K/MM3 (ref 0–0.4)
EOSINOPHIL NFR BLD AUTO: 4.2 % (ref 0–4.3)
HCT VFR BLD CALC: 40.7 % (ref 35.5–45.6)
HEMOLYSIS INDEX: 19
HGB BLD-MCNC: 12.8 GM/DL (ref 11.8–15.2)
INR PPP: 0.9 (ref 0.87–1.13)
LYMPHOCYTES # BLD AUTO: 1.2 K/MM3 (ref 1.2–5.4)
LYMPHOCYTES NFR BLD AUTO: 24.6 % (ref 13.4–35)
MCHC RBC AUTO-ENTMCNC: 31 % (ref 32–34)
MCV RBC AUTO: 85 FL (ref 84–94)
MONOCYTES # (AUTO): 0.6 K/MM3 (ref 0–0.8)
MONOCYTES % (AUTO): 12.1 % (ref 0–7.3)
PLATELET # BLD: 172 K/MM3 (ref 140–440)
RBC # BLD AUTO: 4.79 M/MM3 (ref 3.65–5.03)

## 2022-07-18 PROCEDURE — 99284 EMERGENCY DEPT VISIT MOD MDM: CPT

## 2022-07-18 PROCEDURE — 84484 ASSAY OF TROPONIN QUANT: CPT

## 2022-07-18 PROCEDURE — 80053 COMPREHEN METABOLIC PANEL: CPT

## 2022-07-18 PROCEDURE — 71046 X-RAY EXAM CHEST 2 VIEWS: CPT

## 2022-07-18 PROCEDURE — 83880 ASSAY OF NATRIURETIC PEPTIDE: CPT

## 2022-07-18 PROCEDURE — 36415 COLL VENOUS BLD VENIPUNCTURE: CPT

## 2022-07-18 PROCEDURE — 96374 THER/PROPH/DIAG INJ IV PUSH: CPT

## 2022-07-18 PROCEDURE — 85025 COMPLETE CBC W/AUTO DIFF WBC: CPT

## 2022-07-18 PROCEDURE — 85610 PROTHROMBIN TIME: CPT

## 2022-07-18 PROCEDURE — 85730 THROMBOPLASTIN TIME PARTIAL: CPT

## 2022-07-18 PROCEDURE — 93005 ELECTROCARDIOGRAM TRACING: CPT

## 2022-07-18 PROCEDURE — 85379 FIBRIN DEGRADATION QUANT: CPT

## 2022-07-18 NOTE — XRAY REPORT
CHEST 2 VIEWS 



INDICATION / CLINICAL INFORMATION:

sob.



COMPARISON: 

6/2/2022



FINDINGS:



SUPPORT DEVICES: Stable position of cardiac ICD.



HEART / MEDIASTINUM: Stable cardiomegaly. 



LUNGS / PLEURA: No significant pulmonary or pleural abnormality. No pneumothorax. 



ADDITIONAL FINDINGS: No significant additional findings.



Signer Name: Junaid Stanton MD 

Signed: 7/18/2022 12:01 PM

Workstation Name: [id number]
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
## 2022-07-19 NOTE — ELECTROCARDIOGRAPH REPORT
[facility]

                                       

Test Date:    2022               Test Time:    12:02:12

Pat Name:     ESTIVEN CAMPA               Department:   

Patient ID:   [id number]          Room:          

Gender:       M                        Technician:   NURSE

:          [id number]               Requested By: ANAYELI HERNANDEZ

Order Number: [id number]              Reading MD:   Marvin Lake

                                 Measurements

Intervals                              Axis          

Rate:         84                       P:            58

OK:           282                      QRS:          -58

QRSD:         97                       T:            87

QT:           409                                    

QTc:          483                                    

                           Interpretive Statements

Sinus rhythm

Prolonged OK interval

Probable left atrial enlargement

Inferior infarct, old

Anterior infarct, old

Nonspecific T abnormalities, lateral leads

Compared to ECG 2022 07:40:48

No significant change

Electronically Signed On 2022 18:53:43 EDT by Marvin Lake